# Patient Record
Sex: MALE | Race: WHITE | Employment: FULL TIME | URBAN - METROPOLITAN AREA
[De-identification: names, ages, dates, MRNs, and addresses within clinical notes are randomized per-mention and may not be internally consistent; named-entity substitution may affect disease eponyms.]

---

## 2018-09-01 ENCOUNTER — OFFICE VISIT (OUTPATIENT)
Dept: ORTHOPEDIC SURGERY | Age: 48
End: 2018-09-01

## 2018-09-01 VITALS
SYSTOLIC BLOOD PRESSURE: 108 MMHG | HEART RATE: 80 BPM | BODY MASS INDEX: 24.59 KG/M2 | WEIGHT: 153 LBS | HEIGHT: 66 IN | DIASTOLIC BLOOD PRESSURE: 75 MMHG

## 2018-09-01 DIAGNOSIS — M25.531 RIGHT WRIST PAIN: Primary | ICD-10-CM

## 2018-09-01 DIAGNOSIS — M77.8 RIGHT WRIST TENDINITIS: ICD-10-CM

## 2018-09-01 PROCEDURE — L3908 WHO COCK-UP NONMOLDE PRE OTS: HCPCS | Performed by: NURSE PRACTITIONER

## 2018-09-01 PROCEDURE — 99203 OFFICE O/P NEW LOW 30 MIN: CPT | Performed by: NURSE PRACTITIONER

## 2018-09-01 RX ORDER — MELOXICAM 7.5 MG/1
15 TABLET ORAL DAILY
Qty: 30 TABLET | Refills: 0 | Status: SHIPPED | OUTPATIENT
Start: 2018-09-01 | End: 2019-02-04

## 2018-09-01 NOTE — PROGRESS NOTES
Subjective    Chief Complaint   Patient presents with    New Patient     R Wrist       [place] Sprain presents today for right wrist pain. The patient has had pain intermittently for the past week however the past couple of days the pain has been constant and has worsened. There is no specific mechanism of injury. He points to the ulnar styloid as the source of his pain. He says that the pain does radiate down into his fingers at times and he does have intermittent numbness in the fingers at times. The patient works as a  and is often chopping and using that right hand to carry heavy trays. He is mostly right-handed he says. He has been taking ibuprofen as well as using ice with no relief. Pain Assessment  Location of Pain: Wrist  Location Modifiers: Right    Patient's medications, allergies, past medical, surgical, social and family histories were reviewed and updated as appropriate. Physical Exam  Constitutional:  Pt well groomed, no acute distress, well developed, no obvious deformities  Vitals:    09/01/18 0911   BP: 108/75   Pulse: 80   Weight: 153 lb (69.4 kg)   Height: 5' 5.5\" (1.664 m)     -Oriented to person, place, and time  -mood and affect are appropriate    EXAM: Right hand: Pain over ulnar styloid. -There is not deformity  -There  is not ecchymosis  -There is not swelling. -ROM: FROm.  strength 5/5. Phallens sign negative,   Finklesteins negative,   CMC Grind test negative,   Piano Key Test negative,    Cozen's sign negative. Contralateral Exam:  -No obvious deformities  -No abrasions or cellulitis noted, NVI   -Full ROM   -No joint laxity  -no palpable tenderness noted    Neurological:   -There is any complaints of numbness and tingling.    -Motor and sensory is at median, radial and ulnar nerve distributions. -NVI to upper extremities bilaterally.      Skin:  No abrasions, lesions, cellulitic changes, obvious deformities noted     Xray:  3 view (AP/Lat/Oblique) of right hand show:     Assessment: right hand/wrist tendinitis    Plan: The patient was placed in a tightened wrist brace. A prescription for meloxicam was sent over to his pharmacy. He was instructed not to take any other NSAIDs while he is taking his medications. He says that he is able to do light duty at work and will continue working as he can tolerate. He will continue to ice his wrist as needed. He will follow up with hand in 2 weeks if his pain does not improve. Procedures    Kay Rajan Titan Wrist Short Brace     Patient was prescribed a Kay Rajan Titan Wrist Orthosis. The right wrist will require stabilization / immobilization from this semi-rigid / rigid orthosis to improve their function. The orthosis will assist in protecting the affected area, provide functional support and facilitate healing. The patient was educated and fit by a healthcare professional with expert knowledge and specialization in brace application while under the direct supervision of the treating physician. Verbal and written instructions for the use of and application of this item were provided. They were instructed to contact the office immediately should the brace result in increased pain, decreased sensation, increased swelling or worsening of the condition.

## 2018-10-04 ENCOUNTER — TELEPHONE (OUTPATIENT)
Dept: ORTHOPEDIC SURGERY | Age: 48
End: 2018-10-04

## 2018-10-05 RX ORDER — MELOXICAM 15 MG/1
15 TABLET ORAL DAILY
Qty: 30 TABLET | Refills: 1 | Status: SHIPPED | OUTPATIENT
Start: 2018-10-05 | End: 2019-02-04

## 2019-02-04 ENCOUNTER — APPOINTMENT (OUTPATIENT)
Dept: CT IMAGING | Age: 49
End: 2019-02-04
Payer: COMMERCIAL

## 2019-02-04 ENCOUNTER — APPOINTMENT (OUTPATIENT)
Dept: GENERAL RADIOLOGY | Age: 49
End: 2019-02-04
Payer: COMMERCIAL

## 2019-02-04 ENCOUNTER — HOSPITAL ENCOUNTER (EMERGENCY)
Age: 49
Discharge: HOME | End: 2019-02-04
Attending: EMERGENCY MEDICINE
Payer: COMMERCIAL

## 2019-02-04 VITALS
HEIGHT: 65 IN | WEIGHT: 155 LBS | SYSTOLIC BLOOD PRESSURE: 116 MMHG | BODY MASS INDEX: 25.83 KG/M2 | OXYGEN SATURATION: 97 % | DIASTOLIC BLOOD PRESSURE: 76 MMHG | TEMPERATURE: 98.2 F | HEART RATE: 87 BPM | RESPIRATION RATE: 18 BRPM

## 2019-02-04 DIAGNOSIS — G89.29 CHRONIC LOW BACK PAIN WITHOUT SCIATICA, UNSPECIFIED BACK PAIN LATERALITY: Primary | ICD-10-CM

## 2019-02-04 DIAGNOSIS — M54.50 CHRONIC LOW BACK PAIN WITHOUT SCIATICA, UNSPECIFIED BACK PAIN LATERALITY: Primary | ICD-10-CM

## 2019-02-04 LAB
ANION GAP SERPL CALCULATED.3IONS-SCNC: 12 MMOL/L (ref 3–16)
BASOPHILS ABSOLUTE: 0.1 K/UL (ref 0–0.2)
BASOPHILS RELATIVE PERCENT: 0.7 %
BUN BLDV-MCNC: 12 MG/DL (ref 7–20)
CALCIUM SERPL-MCNC: 9.4 MG/DL (ref 8.3–10.6)
CHLORIDE BLD-SCNC: 101 MMOL/L (ref 99–110)
CO2: 26 MMOL/L (ref 21–32)
CREAT SERPL-MCNC: 0.7 MG/DL (ref 0.9–1.3)
EOSINOPHILS ABSOLUTE: 0.1 K/UL (ref 0–0.6)
EOSINOPHILS RELATIVE PERCENT: 0.6 %
GFR AFRICAN AMERICAN: >60
GFR NON-AFRICAN AMERICAN: >60
GLUCOSE BLD-MCNC: 123 MG/DL (ref 70–99)
HCT VFR BLD CALC: 46.6 % (ref 40.5–52.5)
HEMOGLOBIN: 16.3 G/DL (ref 13.5–17.5)
LYMPHOCYTES ABSOLUTE: 1 K/UL (ref 1–5.1)
LYMPHOCYTES RELATIVE PERCENT: 10.3 %
MCH RBC QN AUTO: 31.6 PG (ref 26–34)
MCHC RBC AUTO-ENTMCNC: 34.9 G/DL (ref 31–36)
MCV RBC AUTO: 90.4 FL (ref 80–100)
MONOCYTES ABSOLUTE: 0.3 K/UL (ref 0–1.3)
MONOCYTES RELATIVE PERCENT: 2.7 %
NEUTROPHILS ABSOLUTE: 8.7 K/UL (ref 1.7–7.7)
NEUTROPHILS RELATIVE PERCENT: 85.7 %
PDW BLD-RTO: 12.8 % (ref 12.4–15.4)
PLATELET # BLD: 322 K/UL (ref 135–450)
PMV BLD AUTO: 7.4 FL (ref 5–10.5)
POTASSIUM SERPL-SCNC: 4.7 MMOL/L (ref 3.5–5.1)
RBC # BLD: 5.15 M/UL (ref 4.2–5.9)
SEDIMENTATION RATE, ERYTHROCYTE: 8 MM/HR (ref 0–20)
SODIUM BLD-SCNC: 139 MMOL/L (ref 136–145)
WBC # BLD: 10.2 K/UL (ref 4–11)

## 2019-02-04 PROCEDURE — 6360000002 HC RX W HCPCS: Performed by: NURSE PRACTITIONER

## 2019-02-04 PROCEDURE — 85025 COMPLETE CBC W/AUTO DIFF WBC: CPT

## 2019-02-04 PROCEDURE — 6370000000 HC RX 637 (ALT 250 FOR IP): Performed by: NURSE PRACTITIONER

## 2019-02-04 PROCEDURE — 80048 BASIC METABOLIC PNL TOTAL CA: CPT

## 2019-02-04 PROCEDURE — 85652 RBC SED RATE AUTOMATED: CPT

## 2019-02-04 PROCEDURE — 74176 CT ABD & PELVIS W/O CONTRAST: CPT

## 2019-02-04 PROCEDURE — 96372 THER/PROPH/DIAG INJ SC/IM: CPT

## 2019-02-04 PROCEDURE — 6370000000 HC RX 637 (ALT 250 FOR IP): Performed by: EMERGENCY MEDICINE

## 2019-02-04 PROCEDURE — 99283 EMERGENCY DEPT VISIT LOW MDM: CPT

## 2019-02-04 RX ORDER — KETOROLAC TROMETHAMINE 30 MG/ML
30 INJECTION, SOLUTION INTRAMUSCULAR; INTRAVENOUS ONCE
Status: COMPLETED | OUTPATIENT
Start: 2019-02-04 | End: 2019-02-04

## 2019-02-04 RX ORDER — METHOCARBAMOL 750 MG/1
750 TABLET, FILM COATED ORAL 4 TIMES DAILY
Qty: 30 TABLET | Refills: 0 | Status: SHIPPED | OUTPATIENT
Start: 2019-02-04 | End: 2019-02-14

## 2019-02-04 RX ORDER — HYDROCODONE BITARTRATE AND ACETAMINOPHEN 5; 325 MG/1; MG/1
1 TABLET ORAL EVERY 6 HOURS PRN
Qty: 6 TABLET | Refills: 0 | Status: SHIPPED | OUTPATIENT
Start: 2019-02-04 | End: 2019-02-07

## 2019-02-04 RX ORDER — HYDROCODONE BITARTRATE AND ACETAMINOPHEN 7.5; 325 MG/1; MG/1
1 TABLET ORAL ONCE
Status: COMPLETED | OUTPATIENT
Start: 2019-02-04 | End: 2019-02-04

## 2019-02-04 RX ORDER — LIDOCAINE 4 G/G
1 PATCH TOPICAL DAILY
Status: DISCONTINUED | OUTPATIENT
Start: 2019-02-04 | End: 2019-02-04 | Stop reason: HOSPADM

## 2019-02-04 RX ORDER — PREDNISONE 20 MG/1
60 TABLET ORAL ONCE
Status: COMPLETED | OUTPATIENT
Start: 2019-02-04 | End: 2019-02-04

## 2019-02-04 RX ORDER — ORPHENADRINE CITRATE 30 MG/ML
60 INJECTION INTRAMUSCULAR; INTRAVENOUS EVERY 12 HOURS
Status: DISCONTINUED | OUTPATIENT
Start: 2019-02-04 | End: 2019-02-04 | Stop reason: HOSPADM

## 2019-02-04 RX ADMIN — ORPHENADRINE CITRATE 60 MG: 30 INJECTION INTRAMUSCULAR; INTRAVENOUS at 09:47

## 2019-02-04 RX ADMIN — PREDNISONE 60 MG: 20 TABLET ORAL at 09:47

## 2019-02-04 RX ADMIN — KETOROLAC TROMETHAMINE 30 MG: 30 INJECTION, SOLUTION INTRAMUSCULAR at 09:47

## 2019-02-04 RX ADMIN — HYDROCODONE BITARTRATE AND ACETAMINOPHEN 1 TABLET: 7.5; 325 TABLET ORAL at 11:29

## 2019-02-04 ASSESSMENT — PAIN SCALES - GENERAL
PAINLEVEL_OUTOF10: 10
PAINLEVEL_OUTOF10: 7
PAINLEVEL_OUTOF10: 10
PAINLEVEL_OUTOF10: 10

## 2019-02-04 ASSESSMENT — PAIN DESCRIPTION - LOCATION
LOCATION: BACK
LOCATION: BACK

## 2019-02-04 ASSESSMENT — PAIN DESCRIPTION - PAIN TYPE
TYPE: ACUTE PAIN
TYPE: ACUTE PAIN

## 2019-02-04 ASSESSMENT — ENCOUNTER SYMPTOMS
COLOR CHANGE: 0
BACK PAIN: 1

## 2019-02-04 ASSESSMENT — PAIN DESCRIPTION - FREQUENCY: FREQUENCY: INTERMITTENT

## 2019-02-04 ASSESSMENT — PAIN DESCRIPTION - ONSET: ONSET: ON-GOING

## 2019-02-04 ASSESSMENT — PAIN DESCRIPTION - DESCRIPTORS: DESCRIPTORS: ACHING

## 2019-02-04 ASSESSMENT — PAIN DESCRIPTION - PROGRESSION: CLINICAL_PROGRESSION: NOT CHANGED

## 2021-04-22 ENCOUNTER — APPOINTMENT (OUTPATIENT)
Dept: CT IMAGING | Age: 51
End: 2021-04-22
Payer: COMMERCIAL

## 2021-04-22 ENCOUNTER — HOSPITAL ENCOUNTER (EMERGENCY)
Age: 51
Discharge: HOME | End: 2021-04-22
Attending: EMERGENCY MEDICINE
Payer: COMMERCIAL

## 2021-04-22 VITALS
SYSTOLIC BLOOD PRESSURE: 130 MMHG | RESPIRATION RATE: 12 BRPM | WEIGHT: 152 LBS | OXYGEN SATURATION: 99 % | BODY MASS INDEX: 25.33 KG/M2 | HEART RATE: 90 BPM | DIASTOLIC BLOOD PRESSURE: 90 MMHG | HEIGHT: 65 IN | TEMPERATURE: 97.4 F

## 2021-04-22 DIAGNOSIS — R42 DIZZINESS: Primary | ICD-10-CM

## 2021-04-22 DIAGNOSIS — Z86.69 H/O MENIERE'S DISEASE: ICD-10-CM

## 2021-04-22 DIAGNOSIS — R11.2 NON-INTRACTABLE VOMITING WITH NAUSEA, UNSPECIFIED VOMITING TYPE: ICD-10-CM

## 2021-04-22 LAB
A/G RATIO: 1.8 (ref 1.1–2.2)
ALBUMIN SERPL-MCNC: 4.8 G/DL (ref 3.4–5)
ALP BLD-CCNC: 75 U/L (ref 40–129)
ALT SERPL-CCNC: 42 U/L (ref 10–40)
ANION GAP SERPL CALCULATED.3IONS-SCNC: 15 MMOL/L (ref 3–16)
AST SERPL-CCNC: 21 U/L (ref 15–37)
BASOPHILS ABSOLUTE: 0.1 K/UL (ref 0–0.2)
BASOPHILS RELATIVE PERCENT: 0.9 %
BILIRUB SERPL-MCNC: <0.2 MG/DL (ref 0–1)
BUN BLDV-MCNC: 12 MG/DL (ref 7–20)
CALCIUM SERPL-MCNC: 10.6 MG/DL (ref 8.3–10.6)
CHLORIDE BLD-SCNC: 103 MMOL/L (ref 99–110)
CO2: 21 MMOL/L (ref 21–32)
CREAT SERPL-MCNC: 0.8 MG/DL (ref 0.9–1.3)
EOSINOPHILS ABSOLUTE: 0.2 K/UL (ref 0–0.6)
EOSINOPHILS RELATIVE PERCENT: 1.4 %
GFR AFRICAN AMERICAN: >60
GFR NON-AFRICAN AMERICAN: >60
GLOBULIN: 2.7 G/DL
GLUCOSE BLD-MCNC: 122 MG/DL (ref 70–99)
HCT VFR BLD CALC: 46.5 % (ref 40.5–52.5)
HEMOGLOBIN: 16.1 G/DL (ref 13.5–17.5)
LYMPHOCYTES ABSOLUTE: 3.2 K/UL (ref 1–5.1)
LYMPHOCYTES RELATIVE PERCENT: 22.7 %
MCH RBC QN AUTO: 30.4 PG (ref 26–34)
MCHC RBC AUTO-ENTMCNC: 34.5 G/DL (ref 31–36)
MCV RBC AUTO: 88.1 FL (ref 80–100)
MONOCYTES ABSOLUTE: 1 K/UL (ref 0–1.3)
MONOCYTES RELATIVE PERCENT: 7.2 %
NEUTROPHILS ABSOLUTE: 9.6 K/UL (ref 1.7–7.7)
NEUTROPHILS RELATIVE PERCENT: 67.8 %
PDW BLD-RTO: 13 % (ref 12.4–15.4)
PLATELET # BLD: 363 K/UL (ref 135–450)
PMV BLD AUTO: 7 FL (ref 5–10.5)
POTASSIUM SERPL-SCNC: 3.8 MMOL/L (ref 3.5–5.1)
RBC # BLD: 5.28 M/UL (ref 4.2–5.9)
SODIUM BLD-SCNC: 139 MMOL/L (ref 136–145)
TOTAL PROTEIN: 7.5 G/DL (ref 6.4–8.2)
WBC # BLD: 14.2 K/UL (ref 4–11)

## 2021-04-22 PROCEDURE — 70498 CT ANGIOGRAPHY NECK: CPT

## 2021-04-22 PROCEDURE — 85025 COMPLETE CBC W/AUTO DIFF WBC: CPT

## 2021-04-22 PROCEDURE — 6370000000 HC RX 637 (ALT 250 FOR IP): Performed by: EMERGENCY MEDICINE

## 2021-04-22 PROCEDURE — 6360000004 HC RX CONTRAST MEDICATION: Performed by: STUDENT IN AN ORGANIZED HEALTH CARE EDUCATION/TRAINING PROGRAM

## 2021-04-22 PROCEDURE — 2580000003 HC RX 258: Performed by: STUDENT IN AN ORGANIZED HEALTH CARE EDUCATION/TRAINING PROGRAM

## 2021-04-22 PROCEDURE — 6360000002 HC RX W HCPCS: Performed by: EMERGENCY MEDICINE

## 2021-04-22 PROCEDURE — 99285 EMERGENCY DEPT VISIT HI MDM: CPT

## 2021-04-22 PROCEDURE — 80053 COMPREHEN METABOLIC PANEL: CPT

## 2021-04-22 PROCEDURE — 70450 CT HEAD/BRAIN W/O DYE: CPT

## 2021-04-22 PROCEDURE — 96374 THER/PROPH/DIAG INJ IV PUSH: CPT

## 2021-04-22 RX ORDER — MECLIZINE HYDROCHLORIDE 25 MG/1
25 TABLET ORAL 3 TIMES DAILY PRN
Qty: 30 TABLET | Refills: 0 | Status: SHIPPED | OUTPATIENT
Start: 2021-04-22 | End: 2021-04-29

## 2021-04-22 RX ORDER — ONDANSETRON 2 MG/ML
4 INJECTION INTRAMUSCULAR; INTRAVENOUS EVERY 30 MIN PRN
Status: DISCONTINUED | OUTPATIENT
Start: 2021-04-22 | End: 2021-04-22 | Stop reason: HOSPADM

## 2021-04-22 RX ORDER — MECLIZINE HCL 12.5 MG/1
25 TABLET ORAL ONCE
Status: COMPLETED | OUTPATIENT
Start: 2021-04-22 | End: 2021-04-22

## 2021-04-22 RX ORDER — ONDANSETRON 2 MG/ML
4 INJECTION INTRAMUSCULAR; INTRAVENOUS ONCE
Status: DISCONTINUED | OUTPATIENT
Start: 2021-04-22 | End: 2021-04-22 | Stop reason: HOSPADM

## 2021-04-22 RX ORDER — METHYLPREDNISOLONE 4 MG/1
TABLET ORAL
Qty: 1 KIT | Refills: 0 | Status: SHIPPED | OUTPATIENT
Start: 2021-04-22 | End: 2021-04-28

## 2021-04-22 RX ORDER — ONDANSETRON 4 MG/1
4 TABLET, FILM COATED ORAL EVERY 8 HOURS PRN
Qty: 10 TABLET | Refills: 0 | Status: SHIPPED | OUTPATIENT
Start: 2021-04-22 | End: 2022-01-27

## 2021-04-22 RX ORDER — DEXAMETHASONE SODIUM PHOSPHATE 4 MG/ML
4 INJECTION, SOLUTION INTRA-ARTICULAR; INTRALESIONAL; INTRAMUSCULAR; INTRAVENOUS; SOFT TISSUE ONCE
Status: COMPLETED | OUTPATIENT
Start: 2021-04-22 | End: 2021-04-22

## 2021-04-22 RX ORDER — 0.9 % SODIUM CHLORIDE 0.9 %
500 INTRAVENOUS SOLUTION INTRAVENOUS ONCE
Status: COMPLETED | OUTPATIENT
Start: 2021-04-22 | End: 2021-04-22

## 2021-04-22 RX ADMIN — DEXAMETHASONE SODIUM PHOSPHATE 4 MG: 4 INJECTION, SOLUTION INTRA-ARTICULAR; INTRALESIONAL; INTRAMUSCULAR; INTRAVENOUS; SOFT TISSUE at 12:55

## 2021-04-22 RX ADMIN — MECLIZINE 25 MG: 12.5 TABLET ORAL at 13:41

## 2021-04-22 RX ADMIN — IOPAMIDOL 75 ML: 755 INJECTION, SOLUTION INTRAVENOUS at 13:33

## 2021-04-22 RX ADMIN — ONDANSETRON 4 MG: 2 INJECTION INTRAMUSCULAR; INTRAVENOUS at 12:55

## 2021-04-22 RX ADMIN — SODIUM CHLORIDE 500 ML: 9 INJECTION, SOLUTION INTRAVENOUS at 12:55

## 2021-04-22 ASSESSMENT — ENCOUNTER SYMPTOMS
STRIDOR: 0
VOMITING: 1
BLOOD IN STOOL: 0
SHORTNESS OF BREATH: 0
EYE REDNESS: 0
ABDOMINAL PAIN: 0
SINUS PRESSURE: 0
NAUSEA: 1
PHOTOPHOBIA: 0
DIARRHEA: 0
COUGH: 0
CHEST TIGHTNESS: 0
SORE THROAT: 0
BACK PAIN: 0
EYE PAIN: 0
FACIAL SWELLING: 0
EYE ITCHING: 0
ANAL BLEEDING: 0
CONSTIPATION: 0
VOICE CHANGE: 0
WHEEZING: 0
EYE DISCHARGE: 0
ABDOMINAL DISTENTION: 0
TROUBLE SWALLOWING: 0
RHINORRHEA: 0

## 2021-04-22 ASSESSMENT — PAIN SCALES - GENERAL: PAINLEVEL_OUTOF10: 2

## 2021-04-22 ASSESSMENT — PAIN DESCRIPTION - LOCATION: LOCATION: EYE

## 2021-04-22 ASSESSMENT — PAIN DESCRIPTION - ORIENTATION: ORIENTATION: LEFT

## 2021-04-22 NOTE — ED PROVIDER NOTES
Never Used   Substance and Sexual Activity    Alcohol use: Yes     Alcohol/week: 3.3 standard drinks     Types: 4 Standard drinks or equivalent per week    Drug use: No    Sexual activity: Not on file   Lifestyle    Physical activity     Days per week: Not on file     Minutes per session: Not on file    Stress: Not on file   Relationships    Social connections     Talks on phone: Not on file     Gets together: Not on file     Attends Adventism service: Not on file     Active member of club or organization: Not on file     Attends meetings of clubs or organizations: Not on file     Relationship status: Not on file    Intimate partner violence     Fear of current or ex partner: Not on file     Emotionally abused: Not on file     Physically abused: Not on file     Forced sexual activity: Not on file   Other Topics Concern    Not on file   Social History Narrative    Not on file     Current Facility-Administered Medications   Medication Dose Route Frequency Provider Last Rate Last Admin    ondansetron (ZOFRAN) injection 4 mg  4 mg Intravenous Q30 Min PRN Claudio Blake MD        meclizine (ANTIVERT) tablet 25 mg  25 mg Oral Once Claudio Blake MD        Dexamethasone Sodium Phosphate injection 4 mg  4 mg Intravenous Once Claudio Blake MD         Current Outpatient Medications   Medication Sig Dispense Refill    Ascorbic Acid (VITAMIN C) 250 MG tablet Take 250 mg by mouth daily.  Multiple Vitamins-Minerals (THERAPEUTIC MULTIVITAMIN-MINERALS) tablet Take 1 tablet by mouth daily. No Known Allergies    REVIEW OF SYSTEMS  10 systems reviewed, pertinent positives per HPI otherwise noted to be negative. PHYSICAL EXAM  /83   Pulse 91   Temp 97.4 °F (36.3 °C)   Resp 26   Wt 152 lb (68.9 kg)   SpO2 99%   BMI 25.29 kg/m²   Physical Exam  Constitutional:       Appearance: Normal appearance. HENT:      Head: Normocephalic and atraumatic.       Nose: Nose normal.      Mouth/Throat: Mouth: Mucous membranes are moist.   Eyes:      Extraocular Movements: Extraocular movements intact. Left eye: Nystagmus present. Pupils: Pupils are equal, round, and reactive to light. Comments: Horizontal nystagmus of the left eye. Cardiovascular:      Rate and Rhythm: Normal rate and regular rhythm. Pulmonary:      Effort: Pulmonary effort is normal.      Breath sounds: Normal breath sounds. Abdominal:      Palpations: Abdomen is soft. Neurological:      Mental Status: He is alert. Psychiatric:         Mood and Affect: Mood normal.         Behavior: Behavior normal.       LABS  I have reviewed all labs for this visit. Results for orders placed or performed during the hospital encounter of 04/22/21   CBC Auto Differential   Result Value Ref Range    WBC 14.2 (H) 4.0 - 11.0 K/uL    RBC 5.28 4.20 - 5.90 M/uL    Hemoglobin 16.1 13.5 - 17.5 g/dL    Hematocrit 46.5 40.5 - 52.5 %    MCV 88.1 80.0 - 100.0 fL    MCH 30.4 26.0 - 34.0 pg    MCHC 34.5 31.0 - 36.0 g/dL    RDW 13.0 12.4 - 15.4 %    Platelets 045 015 - 831 K/uL    MPV 7.0 5.0 - 10.5 fL    Neutrophils % 67.8 %    Lymphocytes % 22.7 %    Monocytes % 7.2 %    Eosinophils % 1.4 %    Basophils % 0.9 %    Neutrophils Absolute 9.6 (H) 1.7 - 7.7 K/uL    Lymphocytes Absolute 3.2 1.0 - 5.1 K/uL    Monocytes Absolute 1.0 0.0 - 1.3 K/uL    Eosinophils Absolute 0.2 0.0 - 0.6 K/uL    Basophils Absolute 0.1 0.0 - 0.2 K/uL         RADIOLOGY  X-RAYS:  I have reviewed radiologic plain film image(s). ALL OTHER NON-PLAIN FILM IMAGES SUCH AS CT, ULTRASOUND AND MRI HAVE BEEN READ BY THE RADIOLOGIST. CTA HEAD NECK W CONTRAST   Final Result   No large vessel occlusion or hemodynamic stenosis. Fetal origin bilateral PCAs. CT HEAD WO CONTRAST   Final Result   No acute intracranial abnormality. Rechecks: Physical assessment performed. Patient is resting currently in bed.   He reports improvement in nausea, and reduction in room unspecified vomiting type    3. H/O Meniere's disease        Blood pressure 122/83, pulse 91, temperature 97.4 °F (36.3 °C), resp. rate 26, weight 152 lb (68.9 kg), SpO2 99 %. DISPOSITION  Amparo Castano was discharged to home in stable condition.        Haleigh Jimenes,   Resident  04/23/21 4145

## 2021-04-22 NOTE — ED PROVIDER NOTES
I interviewed and examined this patient with Dr. Bibiana Villalobos, resident. Please see his note for details of H&P. Sarina Delgadillo is a 44year old male with a history of Meniere's disease and seasonal environmental allergies who has been experiencing some dizziness and nausea for the last few days. He was on his way to visit his ENT doctor this morning when he became quite nauseated and vomited. He denies any pain. NIH stroke score performed, and it is 0. He reports tinitus L>R.        /83   Pulse 91   Temp 97.4 °F (36.3 °C)   Resp 26   Wt 152 lb (68.9 kg)   SpO2 99%   BMI 25.29 kg/m²     I have reviewed the following from the nursing documentation:      Prior to Admission medications    Medication Sig Start Date End Date Taking? Authorizing Provider   Ascorbic Acid (VITAMIN C) 250 MG tablet Take 250 mg by mouth daily. Historical Provider, MD   Multiple Vitamins-Minerals (THERAPEUTIC MULTIVITAMIN-MINERALS) tablet Take 1 tablet by mouth daily.     Historical Provider, MD       Allergies as of 04/22/2021    (No Known Allergies)       Past Medical History:   Diagnosis Date    H/O Meniere's disease     Rectal exam 11/26/2010        Surgical History:   Past Surgical History:   Procedure Laterality Date    APPENDECTOMY      SHOULDER SURGERY          Family History:    Family History   Problem Relation Age of Onset    Breast Cancer Mother        Social History     Socioeconomic History    Marital status: Single     Spouse name: Not on file    Number of children: Not on file    Years of education: Not on file    Highest education level: Not on file   Occupational History    Not on file   Social Needs    Financial resource strain: Not on file    Food insecurity     Worry: Not on file     Inability: Not on file    Transportation needs     Medical: Not on file     Non-medical: Not on file   Tobacco Use    Smoking status: Current Every Day Smoker     Packs/day: 0.30     Types: Cigarettes  Smokeless tobacco: Never Used   Substance and Sexual Activity    Alcohol use: Yes     Alcohol/week: 3.3 standard drinks     Types: 4 Standard drinks or equivalent per week    Drug use: No    Sexual activity: Not on file   Lifestyle    Physical activity     Days per week: Not on file     Minutes per session: Not on file    Stress: Not on file   Relationships    Social connections     Talks on phone: Not on file     Gets together: Not on file     Attends Lutheran service: Not on file     Active member of club or organization: Not on file     Attends meetings of clubs or organizations: Not on file     Relationship status: Not on file    Intimate partner violence     Fear of current or ex partner: Not on file     Emotionally abused: Not on file     Physically abused: Not on file     Forced sexual activity: Not on file   Other Topics Concern    Not on file   Social History Narrative    Not on file         Review of Systems   Constitutional: Negative for activity change, appetite change, chills, diaphoresis, fatigue and fever. HENT: Negative. Negative for congestion, dental problem, ear pain, facial swelling, rhinorrhea, sinus pressure, sneezing, sore throat, tinnitus, trouble swallowing and voice change. Eyes: Negative for photophobia, pain, discharge, redness, itching and visual disturbance. Respiratory: Negative for cough, chest tightness, shortness of breath, wheezing and stridor. Cardiovascular: Negative for chest pain, palpitations and leg swelling. Gastrointestinal: Positive for nausea and vomiting. Negative for abdominal distention, abdominal pain, anal bleeding, blood in stool, constipation and diarrhea. Genitourinary: Negative for difficulty urinating, discharge, dysuria, frequency, hematuria, testicular pain and urgency. Musculoskeletal: Negative for back pain, joint swelling, neck pain and neck stiffness. Skin: Negative for rash and wound.    Neurological: Positive for oriented to person, place, and time. GCS: GCS eye subscore is 4. GCS verbal subscore is 5. GCS motor subscore is 6. Cranial Nerves: No cranial nerve deficit. Sensory: Sensation is intact. Motor: Motor function is intact. No abnormal muscle tone. Coordination: Coordination is intact. Coordination normal.      Deep Tendon Reflexes: Reflexes are normal and symmetric. Reflexes normal.      Reflex Scores:       Bicep reflexes are 2+ on the right side and 2+ on the left side. Patellar reflexes are 2+ on the right side and 2+ on the left side. Comments: Coordination, gait, speech, balance and cognition are intact. There is no nuchal rigidity or evidence of meningismus. Negative Kernig's and Brudzinski's signs. All sensory and motor components of the brachial/lumbosacral plexus tested are symmetric and intact. No focal deficits appreciated. Psychiatric:         Behavior: Behavior normal.         Thought Content:  Thought content normal.         Judgment: Judgment normal.          Procedures     MDM   Results for orders placed or performed during the hospital encounter of 04/22/21   CBC Auto Differential   Result Value Ref Range    WBC 14.2 (H) 4.0 - 11.0 K/uL    RBC 5.28 4.20 - 5.90 M/uL    Hemoglobin 16.1 13.5 - 17.5 g/dL    Hematocrit 46.5 40.5 - 52.5 %    MCV 88.1 80.0 - 100.0 fL    MCH 30.4 26.0 - 34.0 pg    MCHC 34.5 31.0 - 36.0 g/dL    RDW 13.0 12.4 - 15.4 %    Platelets 145 414 - 218 K/uL    MPV 7.0 5.0 - 10.5 fL    Neutrophils % 67.8 %    Lymphocytes % 22.7 %    Monocytes % 7.2 %    Eosinophils % 1.4 %    Basophils % 0.9 %    Neutrophils Absolute 9.6 (H) 1.7 - 7.7 K/uL    Lymphocytes Absolute 3.2 1.0 - 5.1 K/uL    Monocytes Absolute 1.0 0.0 - 1.3 K/uL    Eosinophils Absolute 0.2 0.0 - 0.6 K/uL    Basophils Absolute 0.1 0.0 - 0.2 K/uL   Comprehensive Metabolic Panel   Result Value Ref Range    Sodium 139 136 - 145 mmol/L    Potassium 3.8 3.5 - 5.1 mmol/L    Chloride 103 99 - 110 mmol/L    CO2 21 21 - 32 mmol/L    Anion Gap 15 3 - 16    Glucose 122 (H) 70 - 99 mg/dL    BUN 12 7 - 20 mg/dL    CREATININE 0.8 (L) 0.9 - 1.3 mg/dL    GFR Non-African American >60 >60    GFR African American >60 >60    Calcium 10.6 8.3 - 10.6 mg/dL    Total Protein 7.5 6.4 - 8.2 g/dL    Albumin 4.8 3.4 - 5.0 g/dL    Albumin/Globulin Ratio 1.8 1.1 - 2.2    Total Bilirubin <0.2 0.0 - 1.0 mg/dL    Alkaline Phosphatase 75 40 - 129 U/L    ALT 42 (H) 10 - 40 U/L    AST 21 15 - 37 U/L    Globulin 2.7 g/dL       I estimate there is LOW risk for SUBARACHNOID HEMORRHAGE, MENINGITIS, INTRACRANIAL HEMORRHAGE, SUBDURAL HEMATOMA, OR STROKE, thus I consider the discharge disposition reasonable. Al Toledo and I have discussed the diagnosis and risks, and we agree with discharging home to follow-up with their primary doctor. We also discussed returning to the Emergency Department immediately if new or worsening symptoms occur. We have discussed the symptoms which are most concerning (e.g., changing or worsening pain, weakness, vomiting, fever) that necessitate immediate return. Final Impression    1. Dizziness    2. Non-intractable vomiting with nausea, unspecified vomiting type    3. H/O Meniere's disease        Discharge Vital Signs:  Blood pressure 120/87, pulse 90, temperature 97.4 °F (36.3 °C), resp. rate 16, height 5' 5\" (1.651 m), weight 152 lb (68.9 kg), SpO2 97 %. Radiology  Ct Head Wo Contrast    Result Date: 4/22/2021  No acute intracranial abnormality. Cta Head Neck W Contrast    Result Date: 4/22/2021  No large vessel occlusion or hemodynamic stenosis. Fetal origin bilateral PCAs. Recheck 2:30 pm Patient is feeling much improved. No adverse reaction to medications. No further emesis. We have consulted ENT Dr. Suzanna Ryan regarding this patient. Please see Dr. Carol Clancy note for details of this consultation. ENT agrees with outpatient management and agrees to follow up in office. Austin Renee MD  04/22/21 4034

## 2021-04-22 NOTE — DISCHARGE INSTR - COC
Delivery   [facility] MED Delivery:[id number]}    Wound Care Documentation and Therapy:        Elimination:  Continence:   · Bowel: {YES / UQ:85898}  · Bladder: {YES / IH:11730}  Urinary Catheter: {Urinary Catheter:158370777}   Colostomy/Ileostomy/Ileal Conduit: {YES / HO:47646}       Date of Last BM: ***  No intake or output data in the 24 hours ending 21 1510  No intake/output data recorded.     Safety Concerns:     [facility] Safety Concerns:037727689}    Impairments/Disabilities:      [facility] Impairments/Disabilities:188158420}    Nutrition Therapy:  Current Nutrition Therapy:   [address] [place] [address] Diet List:280613204}    Routes of Feeding: {CHP DME Other Feedings:448697389}  Liquids: {Slp liquid thickness:21623}  Daily Fluid Restriction: {CHP DME Yes amt example:428418576}  Last Modified Barium Swallow with Video (Video Swallowing Test): {Done Not Done LPPS:035411769}    Treatments at the Time of Hospital Discharge:   Respiratory Treatments: ***  Oxygen Therapy:  {Therapy; copd oxygen:73268}  Ventilator:    { CC Vent DDVC:753167488}    Rehab Therapies: {THERAPEUTIC INTERVENTION:5882221966}  Weight Bearing Status/Restrictions: [address] [place] [address] Weight Bearin}  Other Medical Equipment (for information only, NOT a DME order):  {EQUIPMENT:015635065}  Other Treatments: ***    Patient's personal belongings (please select all that are sent with patient):  {[facility] DME Belongings:875143959}    RN SIGNATURE:  {Esignature:547482750}    CASE MANAGEMENT/SOCIAL WORK SECTION    Inpatient Status Date: ***    Readmission Risk Assessment Score:  Readmission Risk              Risk of Unplanned Readmission:        0           Discharging to Facility/ Agency   · Name:   · Address:  · Phone:  · Fax:    Dialysis Facility (if applicable)   · Name:  · Address:  · Dialysis Schedule:  · Phone:  · Fax:    / signature: {Esignature:306808516}    PHYSICIAN SECTION    Prognosis: {Prognosis:0284674620}    Condition at Discharge: [address] [place] [address] Patient Condition:630339143}    Rehab Potential (if transferring to Rehab): {Prognosis:9499098327}    Recommended Labs or Other Treatments After Discharge: ***    Physician Certification: I certify the above information and transfer of Serina Silva  is necessary for the continuing treatment of the diagnosis listed and that he requires {Admit to Appropriate Level of Care:68749} for {GREATER/LESS:682688389} 30 days.      Update Admission H&P: {CHP DME Changes in KWMYT:865565861}    PHYSICIAN SIGNATURE:  {Esignature:517482529}

## 2021-04-22 NOTE — ED NOTES
[facility]/devonte consult @0318  Re: vertigo per [email or web address] [email or web address] [address]  04/22/21 3622

## 2021-04-29 ENCOUNTER — OFFICE VISIT (OUTPATIENT)
Dept: ENT CLINIC | Age: 51
End: 2021-04-29
Payer: COMMERCIAL

## 2021-04-29 ENCOUNTER — PROCEDURE VISIT (OUTPATIENT)
Dept: AUDIOLOGY | Age: 51
End: 2021-04-29
Payer: COMMERCIAL

## 2021-04-29 VITALS — WEIGHT: 152 LBS | HEIGHT: 65 IN | BODY MASS INDEX: 25.33 KG/M2 | TEMPERATURE: 99.1 F

## 2021-04-29 DIAGNOSIS — H91.22 SUDDEN LEFT HEARING LOSS: Primary | ICD-10-CM

## 2021-04-29 DIAGNOSIS — R42 VERTIGO: ICD-10-CM

## 2021-04-29 DIAGNOSIS — H93.13 TINNITUS, BILATERAL: ICD-10-CM

## 2021-04-29 DIAGNOSIS — R42 DIZZINESS: ICD-10-CM

## 2021-04-29 DIAGNOSIS — H90.3 SENSORINEURAL HEARING LOSS, BILATERAL: Primary | ICD-10-CM

## 2021-04-29 PROCEDURE — 92557 COMPREHENSIVE HEARING TEST: CPT | Performed by: AUDIOLOGIST

## 2021-04-29 PROCEDURE — 92567 TYMPANOMETRY: CPT | Performed by: AUDIOLOGIST

## 2021-04-29 PROCEDURE — 99203 OFFICE O/P NEW LOW 30 MIN: CPT | Performed by: OTOLARYNGOLOGY

## 2021-04-29 RX ORDER — MECLIZINE HCL 12.5 MG/1
12.5 TABLET ORAL 3 TIMES DAILY PRN
Qty: 30 TABLET | Refills: 0 | Status: SHIPPED | OUTPATIENT
Start: 2021-04-29 | End: 2021-04-29

## 2021-04-29 RX ORDER — MECLIZINE HCL 12.5 MG/1
12.5 TABLET ORAL 3 TIMES DAILY PRN
Qty: 30 TABLET | Refills: 0 | Status: SHIPPED | OUTPATIENT
Start: 2021-04-29 | End: 2021-05-09

## 2021-04-29 RX ORDER — PREDNISONE 20 MG/1
TABLET ORAL
Qty: 36 TABLET | Refills: 0 | Status: SHIPPED | OUTPATIENT
Start: 2021-04-29 | End: 2021-05-16

## 2021-04-29 NOTE — PROGRESS NOTES
Tobacco Use    Smoking status: Current Every Day Smoker     Packs/day: 0.30     Types: Cigarettes    Smokeless tobacco: Never Used   Substance Use Topics    Alcohol use: Yes     Alcohol/week: 3.3 standard drinks     Types: 4 Standard drinks or equivalent per week    Drug use: No        Allergies     No Known Allergies    Medications     Current Outpatient Medications   Medication Sig Dispense Refill    predniSONE (DELTASONE) 20 MG tablet Take 3 tablets by mouth daily for 7 days, THEN 2.5 tablets daily for 2 days, THEN 2 tablets daily for 2 days, THEN 1.5 tablets daily for 2 days, THEN 1 tablet daily for 2 days, THEN 0.5 tablets daily for 2 days. 36 tablet 0    meclizine (ANTIVERT) 12.5 MG tablet Take 1 tablet by mouth 3 times daily as needed for Dizziness 30 tablet 0    ondansetron (ZOFRAN) 4 MG tablet Take 1 tablet by mouth every 8 hours as needed for Nausea 10 tablet 0    Ascorbic Acid (VITAMIN C) 250 MG tablet Take 250 mg by mouth daily.  Multiple Vitamins-Minerals (THERAPEUTIC MULTIVITAMIN-MINERALS) tablet Take 1 tablet by mouth daily. No current facility-administered medications for this visit.         Review of Systems     REVIEW OF SYSTEMS  The following systems were reviewed and revealed the following in addition to any already discussed in the HPI:    CONSTITUTIONAL: No weight loss, no fever, no night sweats, no chills  EYES: no vision changes, no blurry vision  EARS: no changes in hearing, no otalgia  NOSE: no epistaxis, no rhinorrhea  RESPIRATORY: No difficulty breathing, no shortness of breath  CV: no chest pain, no peripheral vascular disease  HEME: No coagulation disorder, no bleeding disorder  NEURO: No TIA or stroke-like symptoms  SKIN: No new rashes in the head and neck, no recent skin cancers  MOUTH: No new ulcers, no recent teeth infections  GASTROINTESTINAL: No diarrhea, stomach pain  PSYCH: No anxiety, no depression    PhysicalExam     Vitals:    04/29/21 1413   Temp: 99.1

## 2021-04-29 NOTE — PROGRESS NOTES
Talya Diego   1970, 44 y.o. male   [id number]       Referring Provider: Adama Prasad MD  Referral Type: In an order in [address] [place] [address]    Reason for Visit: Evaluation of suspected change in hearing, tinnitus, and balance. ADULT AUDIOLOGIC EVALUATION      Talya Diego is a 44 y.o. male seen today, 4/29/2021 , Nicci Rowell initial audiologic evaluation. Patient was seen by Adama Prasad MD  prior to today's evaluation. AUDIOLOGIC AND OTHER PERTINENT MEDICAL HISTORY:      Talya Diego noted recent episode of dizziness ~ 1 week ago described as room-spinning accompanied by nausea and vomiting, lasting for hours. He notes prior history of dizziness 9-10 years ago; however was reportedly not as severe as was not associated with nausea or vomiting- denies additional episodes prior to most recent onset. Patient also reports decreased hearing and onset of tinnitus in the left ear gradually worsening over the past 3-4 weeks. No additional significant otologic or medical history was reported. Talya Diego denied otalgia, aural fullness, otorrhea, history of falls, history of occupational/recreational noise exposure, history of head trauma, history of ear surgery and family history of hearing loss. Date: 4/29/2021     IMPRESSIONS:      Today's results revealed an asymmetric sensorineural hearing loss, bilaterally. Asymmetries > 10 dBHL noted from 250-2000Hz, at 8000Hz and in word recognition with left ear (80%) worse than right (100%). Discussed use of tinnitus management strategies. Follow medical recommendations of Adama Prasad MD.     ASSESSMENT AND FINDINGS:     Otoscopy revealed: Clear ear canals bilaterally    RIGHT EAR:  Hearing Sensitivity: Within normal limits through 1kHz sloping to a mild to moderate sensorineural hearing loss through [facility]. Speech Recognition Threshold: 25 dB HL  Word Recognition: Excellent (100%), based on NU-6 25-word list at 60 dBHL using recorded speech stimuli. Tympanometry: Normal peak pressure and compliance, Type A tympanogram, consistent with normal middle ear function. Acoustic Reflexes: Ipsilateral: Could not maintain seal. Contralateral: Could not maintain seal.    LEFT EAR:  Hearing Sensitivity: Moderately-severe rising to moderate sensorineural hearing loss through 3kHz sloping to a moderately-severe sensorineural hearing loss from 4-8kHz. Speech Recognition Threshold: 45 dB HL  Word Recognition: Good (80%), based on NU-6 25-word list at 90 dBHL masked using recorded speech stimuli. Tympanometry: Normal peak pressure and compliance, Type A tympanogram, consistent with normal middle ear function. Acoustic Reflexes: Ipsilateral: Did not test. Contralateral: Did not test.    Reliability: Good  Transducer: Inserts    See scanned audiogram dated 4/29/2021  for results. **NOTE: Asymmetries > 10 dBHL noted from 250-2000Hz, at 8000Hz and in word recognition with left ear (80%) worse than right (100%). PATIENT EDUCATION:       The following items were discussed with the patient:    - Good Communication Strategies   - Tinnitus Management Strategies    - Dizziness    Educational information was shared in the After Visit Summary. RECOMMENDATIONS:                                                                                                                                                                                                                                                            The following items are recommended based on patient report and results from today's appointment:   - Continue medical follow-up with Mak Mcdonald MD.   - Retest hearing as medically indicated and/or sooner if a change in hearing is noted. - Utilize \"Good Communication Strategies\" as discussed to assist in speech understanding with communication partners.    - Maintain a sound enriched environment to assist in the management of tinnitus symptoms. - If medically indicated, consider vestibular evaluation to further investigate symptoms of dizziness.        Cristiano Stoner  Audiologist    Chart CC'd to: Bijan Rico MD      Degree of   Hearing Sensitivity dB Range   Within Normal Limits (WNL) 0 - 20   Mild 20 - 40   Moderate 40 - 55   Moderately-Severe 55 - 70   Severe 70 - 90   Profound 90 +

## 2021-04-29 NOTE — PATIENT INSTRUCTIONS
Good Communication Strategies    Communication can be challenging for anyone, but can be especially difficult for those with some degree of hearing loss. While we may not be able to control every factor that may lead to difficulty with communication, there are Good Communication Strategies that we can all use in our day-to-day lives. Communication takes both parties working together for it to be successful. Tips as a Listener:   1. Control your environment. It is important to limit the amount of background noise in the room when possible. You should also consider having a good light source in the room to best see the other person. 2. Ask for clarification. Instead of saying \"What?\", you can use parts of what you heard to make a new question. For example, if you heard the word \"Thursday\" but not the rest of the week, you may ask \"What was that about Thursday? \" or \"What did you want to do Thursday? \". This shows the person talking that you are listening and will help them better explain what they are saying. 3. Be an advocate for yourself. If you are hearing but not understanding, tell the other person \"I can hear you, but I need you to slow down when you speak. \"  Or if someone is facing the other direction, say \"I cannot hear you when you are not looking at me when we talk. \"       Tips as a Talker:   - Sit or stand 3 to 6 feet away to maximize audibility         -- It is unrealistic to believe someone else will fully hear your message if you are speaking from across the room or in a different room in the house   - Stay at eye level to help with visual cues   - Make sure you have the persons attention before speaking   - Use facial expressions and gestures to accentuate your message   - Raise your voice slightly (do not scream)   - Speak slowly and distinctly   - Use short, simple sentences   - Rephrase your words if the person is having a hard time understanding you    - To avoid distortion, dont speak directly into a persons ear      Some additional items that may be helpful:   - Remain patient - this is important for both parties   - Write down items that still cannot be heard/understood. You may write with pen/paper or consider typing/texting on a cell phone or smart device. - If background noise is unavoidable, try to keep yourself in a good position in the room. By sitting at a vigil on the side of the restaurant (preferably a corner), it will be easier to communicate than if you were sitting at a table in the middle with background noise surrounding you. Keep yourself positioned away from music speakers or heavy foot traffic. Tinnitus: Overview and Management Strategies          Many people have some ringing sounds in their ears once in a while. You may hear a roar, a hiss, a tinkle, or a buzz. The sound usually lasts only a few minutes. If it goes on all the time, you may have tinnitus. Tinnitus is usually caused by long-term exposure to loud noise. This damages the nerves in the inner ear. It can occur with all types of hearing loss. It may be a symptom of almost any ear problem. Tinnitus may be caused by a buildup of earwax. Or, it may be caused by ear infections or certain medicines (especially antibiotics or large amounts of aspirin). You can also hear noises in your ears because of an injury to the ears, drinking too much alcohol or caffeine, or a medical condition. Other conditions may also contribute to tinnitus, including: head and neck trauma, temporomandibular joint disorder (TMJ), sinus pressure and barometric trauma, traumatic brain injury, metabolic disorders, autoimmune disorders, stress, and high blood pressure. You may need tests to evaluate your hearing and to find causes of long-lasting tinnitus. Your doctor may suggest one or more treatments to help you cope with the tinnitus. You can also do things at home to help reduce symptoms.     Follow-up care is a tinnitus symptoms by giving your brain better access to the sounds it is missing. There are some hearing aids with built-in noise generator programs, which may help when amplification alone is not enough. Additional resources may be found through the [employer]inn[employer] at www.jessica.org    When should you call for help? Call 911 anytime you think you may need emergency care. For example, call if:    · You have symptoms of a stroke. These may include:  ? Sudden numbness, tingling, weakness, or loss of movement in your face, arm, or leg, especially on only one side of your body. ? Sudden vision changes. ? Sudden trouble speaking. ? Sudden confusion or trouble understanding simple statements. ? Sudden problems with walking or balance. ? A sudden, severe headache that is different from past headaches. Call your doctor now or seek immediate medical care if:    · You develop other symptoms. These may include hearing loss (or worse hearing loss), balance problems, dizziness, nausea, or vomiting. Watch closely for changes in your health, and be sure to contact your doctor if:    · Your tinnitus moves from both ears to one ear. · Your hearing loss gets worse within 1 day after an ear injury. · Your tinnitus or hearing loss does not get better within 1 week after an ear injury. · Your tinnitus bothers you enough that you want to take medicines to help you cope with it. If you notice changes in your tinnitus and/or your hearing, it is recommended that you have your hearing tested by your audiologist and to follow-up with your physician that manages your hearing loss (such as your ENT or Primary Care doctor). Dizziness: Care Instructions  Your Care Instructions  Dizziness is the feeling of unsteadiness or fuzziness in your head. It is different than having vertigo, which is a feeling that the room is spinning or that you are moving or falling.  It is also different from lightheadedness, which is the feeling that you are about to faint. It can be hard to know what causes dizziness. Some people feel dizzy when they have migraine headaches. Sometimes bouts of flu can make you feel dizzy. Some medical conditions, such as heart problems or high blood pressure, can make you feel dizzy. Many medicines can cause dizziness, including medicines for high blood pressure, pain, or anxiety. If a medicine causes your symptoms, your doctor may recommend that you stop or change the medicine. If it is a problem with your heart, you may need medicine to help your heart work better. If there is no clear reason for your symptoms, your doctor may suggest watching and waiting for a while to see if the dizziness goes away on its own. Follow-up care is a key part of your treatment and safety. Be sure to make and go to all appointments, and call your doctor if you are having problems. It's also a good idea to know your test results and keep a list of the medicines you take. How can you care for yourself at home? · If your doctor recommends or prescribes medicine, take it exactly as directed. Call your doctor if you think you are having a problem with your medicine. · Do not drive while you feel dizzy. · Try to prevent falls. Steps you can take include:  ? Using nonskid mats, adding grab bars near the tub, and using night-lights. ? Clearing your home so that walkways are free of anything you might trip on.  ? Letting family and friends know that you have been feeling dizzy. This will help them know how to help you. When should you call for help? Call 911 anytime you think you may need emergency care. For example, call if:    · You passed out (lost consciousness). · You have dizziness along with symptoms of a heart attack. These may include:  ? Chest pain or pressure, or a strange feeling in the chest.  ? Sweating. ? Shortness of breath. ? Nausea or vomiting.   ? Pain, pressure,

## 2021-05-02 NOTE — PROGRESS NOTES
Fanny Dietrich   1970, 44 y.o. male   [id number]       Referring Provider: Viola Martinez MD  Referral Type: In an order in [employer]    Reason for Visit: Determination of the effect of medication, surgery, or other treatment. ADULT AUDIOLOGIC EVALUATION      Fanny Dietrich is a 44 y.o. male seen today, 5/6/2021 , for an recheck audiologic evaluation. Previous evaluation from 4/29/2021 viewable in medical record. Patient was seen by Viola Martinez MD following today's evaluation. AUDIOLOGIC AND OTHER PERTINENT MEDICAL HISTORY:      Fanny Dietrich noted no change in hearing or tinnitus of the left ear. Per previous evaluation had onset of dizziness 2 weeks ago accompanied by nausea and vomiting lasting for hours with prior history of dizziness 9-10 years ago. No additional changes to otologic or medical health since previous evaluation were reported.     Fanny Dietrich denied otalgia, aural fullness, otorrhea, history of falls, history of occupational/recreational noise exposure, history of head trauma, history of ear surgery and family history of hearing loss. Date: 5/6/2021     IMPRESSIONS:      Today's results revealed an asymmetric sensorineural hearing loss, bilaterally. Asymmetries > 10 dBHL noted from 250-8000Hz with left ear worse than right. Air conduction threshold stable, bilaterally and improvement in word recognition of the left ear from 80% to 96% compared to previous evaluation. Follow medical recommendations of Viola Martinez MD.     ASSESSMENT AND FINDINGS:     Otoscopy revealed: Clear ear canals bilaterally    RIGHT EAR:  Hearing Sensitivity: Within normal limits through 1kHz sloping to a mild to moderate sensorineural hearing loss through [facility]. Speech Recognition Threshold: 20 dB HL  Word Recognition: Excellent (96%), based on NU-6 25-word list at 60 dBHL using recorded speech stimuli.     Tympanometry: Normal peak pressure and compliance, Type A tympanogram, consistent with normal middle ear function. LEFT EAR:  Hearing Sensitivity: Moderately-severe rising to moderate sensorineural hearing loss through 3kHz sloping to a moderately-severe sensorineural hearing loss from 4-8kHz. Speech Recognition Threshold: 40 dB HL  Word Recognition: Excellent (96%), based on NU-6 25-word list at 85 dBHL masked using recorded speech stimuli. Tympanometry: Normal peak pressure and compliance, Type A tympanogram, consistent with normal middle ear function. Reliability: Good  Transducer: Inserts    **NOTE:  Asymmetries > 10 dBHL noted from 250-8000Hz with left ear worse than right. Air conduction threshold stable, bilaterally and improvement in word recognition of the left ear from 80% to 96% compared to previous evaluation. See scanned audiogram dated 5/6/2021  for results. PATIENT EDUCATION:       The following items were discussed with the patient:    - Good Communication Strategies  - Tinnitus Management Strategies     - Dizziness    Educational information was shared in the After Visit Summary. RECOMMENDATIONS:                                                                                                                                                                                                                                                            The following items are recommended based on patient report and results from today's appointment:   - Continue medical follow-up with Imani Little MD.   - Retest hearing as medically indicated and/or sooner if a change in hearing is noted. - Utilize \"Good Communication Strategies\" as discussed to assist in speech understanding with communication partners. - Maintain a sound enriched environment to assist in the management of tinnitus symptoms. - If medically indicated, consider vestibular evaluation to further investigate symptoms of dizziness.        Olman Simmons, AuD  Audiologist    Chart CC'd to: Ildefonso Davila MD      Degree of   Hearing Sensitivity dB Range   Within Normal Limits (WNL) 0 - 20   Mild 20 - 40   Moderate 40 - 55   Moderately-Severe 55 - 70   Severe 70 - 90   Profound 90 +

## 2021-05-06 ENCOUNTER — PROCEDURE VISIT (OUTPATIENT)
Dept: AUDIOLOGY | Age: 51
End: 2021-05-06
Payer: COMMERCIAL

## 2021-05-06 ENCOUNTER — OFFICE VISIT (OUTPATIENT)
Dept: ENT CLINIC | Age: 51
End: 2021-05-06
Payer: COMMERCIAL

## 2021-05-06 VITALS — WEIGHT: 152 LBS | TEMPERATURE: 98.9 F | HEIGHT: 65 IN | BODY MASS INDEX: 25.33 KG/M2

## 2021-05-06 DIAGNOSIS — R42 DIZZINESS: ICD-10-CM

## 2021-05-06 DIAGNOSIS — H91.22 SUDDEN LEFT HEARING LOSS: ICD-10-CM

## 2021-05-06 DIAGNOSIS — H90.A22 SENSORINEURAL HEARING LOSS (SNHL) OF LEFT EAR WITH RESTRICTED HEARING OF RIGHT EAR: ICD-10-CM

## 2021-05-06 DIAGNOSIS — R42 VERTIGO: ICD-10-CM

## 2021-05-06 DIAGNOSIS — H90.3 SENSORINEURAL HEARING LOSS, BILATERAL: Primary | ICD-10-CM

## 2021-05-06 DIAGNOSIS — H93.13 TINNITUS, BILATERAL: ICD-10-CM

## 2021-05-06 PROCEDURE — 69801 INCISE INNER EAR: CPT | Performed by: OTOLARYNGOLOGY

## 2021-05-06 PROCEDURE — 92567 TYMPANOMETRY: CPT | Performed by: AUDIOLOGIST

## 2021-05-06 PROCEDURE — 99024 POSTOP FOLLOW-UP VISIT: CPT | Performed by: OTOLARYNGOLOGY

## 2021-05-06 PROCEDURE — 92557 COMPREHENSIVE HEARING TEST: CPT | Performed by: AUDIOLOGIST

## 2021-05-06 RX ORDER — MECLIZINE HYDROCHLORIDE 25 MG/1
25 TABLET ORAL 3 TIMES DAILY PRN
Qty: 30 TABLET | Refills: 0 | Status: SHIPPED | OUTPATIENT
Start: 2021-05-06 | End: 2021-05-16

## 2021-05-06 RX ORDER — DEXAMETHASONE SODIUM PHOSPHATE 4 MG/ML
10 INJECTION, SOLUTION INTRA-ARTICULAR; INTRALESIONAL; INTRAMUSCULAR; INTRAVENOUS; SOFT TISSUE ONCE
Status: SHIPPED | OUTPATIENT
Start: 2021-05-06

## 2021-05-06 NOTE — PROGRESS NOTES
[address] [facility] SURGERY  NEW PATIENT HISTORY AND PHYSICAL NOTE      Patient Name: Cristiano Manrique  Medical Record Number:  [id number]  Primary Care Physician:  CINDY Baez - WENDI    ChiefComplaint     Chief Complaint   Patient presents with    Hearing Problem     Patient is here to follow up on a left ear sudden hearing loss and dizziness. He states his dizziness is improving, but his hearing has not changed. History of Present Illness     Cristiano Manrique is an 44 y.o. male with prior diagnosis of Ménière's disease, with sudden hearing loss accompanied by roaring tinnitus and monico vertigo on 4/22/2021. He denies any recent head trauma, upper respiratory infections; states that since the season has changes he has had significantly worse tinnitus in the left ear. His vertigo lasted approximately 2-3 hours, and has since resolved although he does have intermittent disequilibrium along with left ear fullness. His hearing loss has persisted, along with significant high-pitched, nonpulsatile fluctuating tinnitus in the left ear. No otalgia, otorrhea. At baseline, prior to this incident, he had worse hearing in the left ear along with tinnitus. No history of head trauma, chronic middle ear disease or ear surgery. No family history of early hearing loss. Was diagnosed with Ménière's disease more than 10 years ago after an episode of vertigo that lasted several hours-did not have roaring tinnitus or hearing loss at that time. Interval History 5/6/2021: No improvement in hearing loss, disequilibrium has been improving with meclizine. No otalgia, otorrhea, ear fullness. Has completed 60 mg high-dose course of prednisone for the past week. Of interest, he is able to work however feels he needs to place a plug in his right ear, as the asymmetry in appreciating sound from his right auditory field is distracting.     Past Medical History     Past Medical History: Diagnosis Date    H/O Meniere's disease     Rectal exam 11/26/2010       Past Surgical History     Past Surgical History:   Procedure Laterality Date    APPENDECTOMY      SHOULDER SURGERY         Family History     Family History   Problem Relation Age of Onset    Breast Cancer Mother        Social History     Social History     Tobacco Use    Smoking status: Current Every Day Smoker     Packs/day: 0.30     Types: Cigarettes    Smokeless tobacco: Never Used   Substance Use Topics    Alcohol use: Yes     Alcohol/week: 3.3 standard drinks     Types: 4 Standard drinks or equivalent per week    Drug use: No        Allergies     No Known Allergies    Medications     Current Outpatient Medications   Medication Sig Dispense Refill    meclizine (ANTIVERT) 25 MG tablet Take 1 tablet by mouth 3 times daily as needed for Dizziness 30 tablet 0    predniSONE (DELTASONE) 20 MG tablet Take 3 tablets by mouth daily for 7 days, THEN 2.5 tablets daily for 2 days, THEN 2 tablets daily for 2 days, THEN 1.5 tablets daily for 2 days, THEN 1 tablet daily for 2 days, THEN 0.5 tablets daily for 2 days. 36 tablet 0    meclizine (ANTIVERT) 12.5 MG tablet Take 1 tablet by mouth 3 times daily as needed for Dizziness 30 tablet 0    ondansetron (ZOFRAN) 4 MG tablet Take 1 tablet by mouth every 8 hours as needed for Nausea 10 tablet 0    Ascorbic Acid (VITAMIN C) 250 MG tablet Take 250 mg by mouth daily.  Multiple Vitamins-Minerals (THERAPEUTIC MULTIVITAMIN-MINERALS) tablet Take 1 tablet by mouth daily.        Current Facility-Administered Medications   Medication Dose Route Frequency Provider Last Rate Last Admin    dexamethasone (DECADRON) injection 10 mg  10 mg Intramuscular Once Thomas Poole MD           Review of Systems     REVIEW OF SYSTEMS  The following systems were reviewed and revealed the following in addition to any already discussed in the HPI:    CONSTITUTIONAL: No weight loss, no fever, no night sweats, no op: Same  Procedure : Instillation of dexamethasone 10mg/mL into the left middle ear   Surgeon: ARIS Jorgensen  Estimated Blood Loss: None    Procedure:   1. Diagnostic binocular otomicroscopy  2. Instillation of steroid into the left middle ear space    Risks and benefits of steroid instillation include pain, inflammation, infection, otorrhea, retained eardrum perforation; off-label use of steroid was discussed - patient agreed to proceed. After obtaining consent, the patient was placed in the examination chair in the reclined position.      -Left ear: External auditory canal clear, tympanic membrane without retractions/perforation - anterior superior quadrant painted with phenol and a 25-gauge needle advanced into the middle ear. Steroid was instilled into the right ear, approximately 1cc delivered, with visualization of fluid meniscus  in the middle ear over the incudostapedial joint appreciated. The patient was then placed supine with the neck extended and head turned 45-degrees to the contralateral ear for 20-30 minutes     * Patient tolerated the procedure well with no complications    Assessment and Plan     1. Sudden left hearing loss  Patient with sudden hearing loss with losses in the low frequencies on the left side. May represent Ménière's disease, or another vestibular cochlear pathology-we have treated him with high-dose steroids for 1 week. Today his word recognition has significantly improved, although pure tone audiometry remains poor. 2. Vertigo  Likely due to vestibular neuritis, may also represent Ménière's disease-we started the patient on high-dose steroids, will also represcribe meclizine for symptomatic relief. Return in about 1 week (around 5/13/2021).     Britton Hampton MD  [facility]  Department of Otolaryngology/Head and Neck Surgery  5/6/21    I have performed a head and neck physical exam personally or was physically present during the key or critical portions of the service. Medical Decision Making: The following items were considered in medical decision making:  Independent review of images  Review / order clinical lab tests  Review / order radiology tests  Decision to obtain old records  Review and summation of old records as accessed through [facility] (a summary of my findings in these old records: [id number] [facility] records from prior otolaryngology visits appreciated)     Portions of this note were dictated using Dragon.  There may be linguistic errors secondary to the use of this program.

## 2021-05-06 NOTE — PATIENT INSTRUCTIONS
Good Communication Strategies    Communication can be challenging for anyone, but can be especially difficult for those with some degree of hearing loss. While we may not be able to control every factor that may lead to difficulty with communication, there are Good Communication Strategies that we can all use in our day-to-day lives. Communication takes both parties working together for it to be successful. Tips as a Listener:   1. Control your environment. It is important to limit the amount of background noise in the room when possible. You should also consider having a good light source in the room to best see the other person. 2. Ask for clarification. Instead of saying \"What?\", you can use parts of what you heard to make a new question. For example, if you heard the word \"Thursday\" but not the rest of the week, you may ask \"What was that about Thursday? \" or \"What did you want to do Thursday? \". This shows the person talking that you are listening and will help them better explain what they are saying. 3. Be an advocate for yourself. If you are hearing but not understanding, tell the other person \"I can hear you, but I need you to slow down when you speak. \"  Or if someone is facing the other direction, say \"I cannot hear you when you are not looking at me when we talk. \"       Tips as a Talker:   - Sit or stand 3 to 6 feet away to maximize audibility         -- It is unrealistic to believe someone else will fully hear your message if you are speaking from across the room or in a different room in the house   - Stay at eye level to help with visual cues   - Make sure you have the persons attention before speaking   - Use facial expressions and gestures to accentuate your message   - Raise your voice slightly (do not scream)   - Speak slowly and distinctly   - Use short, simple sentences   - Rephrase your words if the person is having a hard time understanding you    - To avoid distortion, dont speak directly into a persons ear      Some additional items that may be helpful:   - Remain patient - this is important for both parties   - Write down items that still cannot be heard/understood. You may write with pen/paper or consider typing/texting on a cell phone or smart device. - If background noise is unavoidable, try to keep yourself in a good position in the room. By sitting at a vigil on the side of the restaurant (preferably a corner), it will be easier to communicate than if you were sitting at a table in the middle with background noise surrounding you. Keep yourself positioned away from music speakers or heavy foot traffic. Tinnitus: Overview and Management Strategies          Many people have some ringing sounds in their ears once in a while. You may hear a roar, a hiss, a tinkle, or a buzz. The sound usually lasts only a few minutes. If it goes on all the time, you may have tinnitus. Tinnitus is usually caused by long-term exposure to loud noise. This damages the nerves in the inner ear. It can occur with all types of hearing loss. It may be a symptom of almost any ear problem. Tinnitus may be caused by a buildup of earwax. Or, it may be caused by ear infections or certain medicines (especially antibiotics or large amounts of aspirin). You can also hear noises in your ears because of an injury to the ears, drinking too much alcohol or caffeine, or a medical condition. Other conditions may also contribute to tinnitus, including: head and neck trauma, temporomandibular joint disorder (TMJ), sinus pressure and barometric trauma, traumatic brain injury, metabolic disorders, autoimmune disorders, stress, and high blood pressure. You may need tests to evaluate your hearing and to find causes of long-lasting tinnitus. Your doctor may suggest one or more treatments to help you cope with the tinnitus. You can also do things at home to help reduce symptoms.     Follow-up care is a key part of your treatment and safety. Be sure to make and go to all appointments, and call your doctor if you are having problems. It's also a good idea to know your test results and keep a list of the medicines you take. How can you care for yourself at home? · Limit or cut out alcohol, caffeine, and sodium. They can make your symptoms worse. · Do not smoke or use other tobacco products. Nicotine reduces blood flow to the ear and makes tinnitus worse. If you need help quitting, talk to your doctor about stop-smoking programs and medicines. These can increase your chances of quitting for good. · Talk to your doctor about whether to stop taking aspirin and similar products such as ibuprofen or naproxen. · Get exercise often. It can help improve blood flow to the ear. Ways to manage/cope with tinnitus  Some tinnitus may last a long time. To manage your tinnitus, try to:  · Avoid noises that you think caused your tinnitus. If you can't avoid loud noises, wear earplugs or earmuffs. · Ignore the sound by paying attention to other things. Keeping your brain busy with other tasks or background noise can help your brain not focus on the tinnitus. · Try to not give the tinnitus an emotional reaction. Do your best to ignore the sound and not let it bother you. Relax using biofeedback, meditation, or yoga. Feeling stressed and being tired can make tinnitus worse. · Play music or white noise to help you sleep. Background noise may cover up the noise that you hear in your ears. You can buy a tabletop machine or a device that sits under your pillow to play soothing sounds, like ocean waves. · Smart phones have free apps, such as Whist, Relax Melodies, ReSound Relief, and White Noise Lite. These apps have different types of sounds/noise, some of which you can blend together to find sounds that are most soothing to you.   · Hearing aid technology, especially when there is some hearing loss, may help reduce tinnitus symptoms by giving your brain better access to the sounds it is missing. There are some hearing aids with built-in noise generator programs, which may help when amplification alone is not enough. Additional resources may be found through the [employer] at www.jessica.org    When should you call for help? Call 911 anytime you think you may need emergency care. For example, call if:    · You have symptoms of a stroke. These may include:  ? Sudden numbness, tingling, weakness, or loss of movement in your face, arm, or leg, especially on only one side of your body. ? Sudden vision changes. ? Sudden trouble speaking. ? Sudden confusion or trouble understanding simple statements. ? Sudden problems with walking or balance. ? A sudden, severe headache that is different from past headaches. Call your doctor now or seek immediate medical care if:    · You develop other symptoms. These may include hearing loss (or worse hearing loss), balance problems, dizziness, nausea, or vomiting. Watch closely for changes in your health, and be sure to contact your doctor if:    · Your tinnitus moves from both ears to one ear. · Your hearing loss gets worse within 1 day after an ear injury. · Your tinnitus or hearing loss does not get better within 1 week after an ear injury. · Your tinnitus bothers you enough that you want to take medicines to help you cope with it. If you notice changes in your tinnitus and/or your hearing, it is recommended that you have your hearing tested by your audiologist and to follow-up with your physician that manages your hearing loss (such as your ENT or Primary Care doctor). Dizziness: Care Instructions  Your Care Instructions  Dizziness is the feeling of unsteadiness or fuzziness in your head. It is different than having vertigo, which is a feeling that the room is spinning or that you are moving or falling.  It is also different from lightheadedness, which is the feeling that you are about to faint. It can be hard to know what causes dizziness. Some people feel dizzy when they have migraine headaches. Sometimes bouts of flu can make you feel dizzy. Some medical conditions, such as heart problems or high blood pressure, can make you feel dizzy. Many medicines can cause dizziness, including medicines for high blood pressure, pain, or anxiety. If a medicine causes your symptoms, your doctor may recommend that you stop or change the medicine. If it is a problem with your heart, you may need medicine to help your heart work better. If there is no clear reason for your symptoms, your doctor may suggest watching and waiting for a while to see if the dizziness goes away on its own. Follow-up care is a key part of your treatment and safety. Be sure to make and go to all appointments, and call your doctor if you are having problems. It's also a good idea to know your test results and keep a list of the medicines you take. How can you care for yourself at home? · If your doctor recommends or prescribes medicine, take it exactly as directed. Call your doctor if you think you are having a problem with your medicine. · Do not drive while you feel dizzy. · Try to prevent falls. Steps you can take include:  ? Using nonskid mats, adding grab bars near the tub, and using night-lights. ? Clearing your home so that walkways are free of anything you might trip on.  ? Letting family and friends know that you have been feeling dizzy. This will help them know how to help you. When should you call for help? Call 911 anytime you think you may need emergency care. For example, call if:    · You passed out (lost consciousness). · You have dizziness along with symptoms of a heart attack. These may include:  ? Chest pain or pressure, or a strange feeling in the chest.  ? Sweating. ? Shortness of breath. ? Nausea or vomiting.   ? Pain, pressure, or a strange feeling in the back, neck, jaw, or upper belly or in one or both shoulders or arms. ? Lightheadedness or sudden weakness. ? A fast or irregular heartbeat. · You have symptoms of a stroke. These may include:  ? Sudden numbness, tingling, weakness, or loss of movement in your face, arm, or leg, especially on only one side of your body. ? Sudden vision changes. ? Sudden trouble speaking. ? Sudden confusion or trouble understanding simple statements. ? Sudden problems with walking or balance. ? A sudden, severe headache that is different from past headaches. Call your doctor now or seek immediate medical care if:    · You feel dizzy and have a fever, headache, or ringing in your ears. · You have new or increased nausea and vomiting. · Your dizziness does not go away or comes back. Watch closely for changes in your health, and be sure to contact your doctor if:    · You do not get better as expected. Where can you learn more? Go to https://[email or web address].RhinoCyte. org and sign in to your Meiyou account. Enter J019 in the [employer] box to learn more about \"Dizziness: Care Instructions. \"     If you do not have an account, please click on the \"Sign Up Now\" link. Current as of: September 23, 2018  Content Version: 11.9  © 7461-2949 [employer]. Care instructions adapted under license by [facility] ([facility]). If you have questions about a medical condition or this instruction, always ask your healthcare professional. [place] [address] any warranty or liability for your use of this information.

## 2021-05-06 NOTE — Clinical Note
Dr. Nicole Guerrero,    Thank you for your referral for audiometric testing on this patient. Please see the scanned audiogram (under \"Media\" tab) and encounter note for details. If you have any questions, or if there is anything else you need, please let me know.       Cristiano Corbin  Audiologist  ---  [id number] [facility] - Audiology

## 2021-05-10 ENCOUNTER — TELEPHONE (OUTPATIENT)
Dept: ENT CLINIC | Age: 51
End: 2021-05-10

## 2021-05-10 ENCOUNTER — PROCEDURE VISIT (OUTPATIENT)
Dept: ENT CLINIC | Age: 51
End: 2021-05-10
Payer: COMMERCIAL

## 2021-05-10 VITALS
BODY MASS INDEX: 25.69 KG/M2 | TEMPERATURE: 99.1 F | SYSTOLIC BLOOD PRESSURE: 117 MMHG | HEART RATE: 88 BPM | WEIGHT: 154.4 LBS | DIASTOLIC BLOOD PRESSURE: 75 MMHG

## 2021-05-10 DIAGNOSIS — H91.22 SUDDEN LEFT HEARING LOSS: Primary | ICD-10-CM

## 2021-05-10 PROCEDURE — 69801 INCISE INNER EAR: CPT | Performed by: OTOLARYNGOLOGY

## 2021-05-10 RX ORDER — BUSPIRONE HYDROCHLORIDE 10 MG/1
TABLET ORAL
COMMUNITY
Start: 2020-10-08

## 2021-05-10 NOTE — PROGRESS NOTES
Initial mild improvement in hearing on the left side, however he states that has regressed. Continues to have bilateral tinnitus, worse in the left ear. Past Medical History     Past Medical History:   Diagnosis Date    H/O Meniere's disease     Rectal exam 11/26/2010       Past Surgical History     Past Surgical History:   Procedure Laterality Date    APPENDECTOMY      SHOULDER SURGERY         Family History     Family History   Problem Relation Age of Onset    Breast Cancer Mother        Social History     Social History     Tobacco Use    Smoking status: Current Every Day Smoker     Packs/day: 0.30     Types: Cigarettes    Smokeless tobacco: Never Used   Substance Use Topics    Alcohol use: Yes     Alcohol/week: 3.3 standard drinks     Types: 4 Standard drinks or equivalent per week    Drug use: No        Allergies     No Known Allergies    Medications     Current Outpatient Medications   Medication Sig Dispense Refill    busPIRone (BUSPAR) 10 MG tablet TAKE ONE TABLET BY MOUTH TWICE A DAY      meclizine (ANTIVERT) 25 MG tablet Take 1 tablet by mouth 3 times daily as needed for Dizziness 30 tablet 0    predniSONE (DELTASONE) 20 MG tablet Take 3 tablets by mouth daily for 7 days, THEN 2.5 tablets daily for 2 days, THEN 2 tablets daily for 2 days, THEN 1.5 tablets daily for 2 days, THEN 1 tablet daily for 2 days, THEN 0.5 tablets daily for 2 days. 36 tablet 0    ondansetron (ZOFRAN) 4 MG tablet Take 1 tablet by mouth every 8 hours as needed for Nausea 10 tablet 0    Ascorbic Acid (VITAMIN C) 250 MG tablet Take 250 mg by mouth daily.  Multiple Vitamins-Minerals (THERAPEUTIC MULTIVITAMIN-MINERALS) tablet Take 1 tablet by mouth daily.        Current Facility-Administered Medications   Medication Dose Route Frequency Provider Last Rate Last Admin    dexamethasone (DECADRON) injection 10 mg  10 mg Intramuscular Once Ana M Maria MD           Review of Systems     REVIEW OF SYSTEMS  The following systems were reviewed and revealed the following in addition to any already discussed in the HPI:    CONSTITUTIONAL: No weight loss, no fever, no night sweats, no chills  EYES: no vision changes, no blurry vision  EARS: no changes in hearing, no otalgia  NOSE: no epistaxis, no rhinorrhea  RESPIRATORY: No difficulty breathing, no shortness of breath  CV: no chest pain, no peripheral vascular disease  HEME: No coagulation disorder, no bleeding disorder  NEURO: No TIA or stroke-like symptoms  SKIN: No new rashes in the head and neck, no recent skin cancers  MOUTH: No new ulcers, no recent teeth infections  GASTROINTESTINAL: No diarrhea, stomach pain  PSYCH: No anxiety, no depression    PhysicalExam     Vitals:    05/10/21 1058   BP: 117/75   Site: Right Upper Arm   Position: Sitting   Cuff Size: Large Adult   Pulse: 88   Temp: 99.1 °F (37.3 °C)   TempSrc: Infrared   Weight: 154 lb 6.4 oz (70 kg)       PHYSICAL EXAM  /75 (Site: Right Upper Arm, Position: Sitting, Cuff Size: Large Adult)   Pulse 88   Temp 99.1 °F (37.3 °C) (Infrared)   Wt 154 lb 6.4 oz (70 kg)   BMI 25.69 kg/m²     GENERAL: No Acute Distress, Alert and Oriented, no hoarseness  EYES: EOMI, Anti-icteric.   No gaze-paretic nystagmus  NOSE: On anterior rhinoscopy there is no epistaxis, nasal mucosa within normal limits, no purulent drainage  EARS: Normal external appearance; on portable otomicroscopy:  -Right ear: External auditory canal without stenosis, tympanic membrane clear, no middle ear effusions or retractions  -Left ear: External auditory canal without stenosis, tympanic membrane clear, no middle ear effusions or retractions  FACE: 1/6 House-Brackmann Scale, symmetric, sensation equal bilaterally  ORAL CAVITY: 2 cm patch of leukoplakia over the right lateral tongue, uvula is midline, moist mucous membranes, 0+ tonsils, good dentition  NECK: Normal range of motion, no thyromegaly, trachea is midline, no lymphadenopathy, no neck masses, no crepitus  CHEST: Normal respiratory effort, no retractions, breathing comfortably  SKIN: No rashes, normal appearing skin, no evidence of skin lesions/tumors  NEURO: CN 2, 3, 4, 5, 6, 7, 11, 12 intact bilaterally     Data/Imaging Review            Procedure     Trans-tympanic steroid injection of the left ear     Pre op: Sudden hearing loss  Post op: Same  Procedure : Instillation of dexamethasone 10mg/mL into the left middle ear   Surgeon: ARIS Jorgensen  Estimated Blood Loss: None    Procedure:   1. Diagnostic binocular otomicroscopy  2. Instillation of steroid into the left middle ear space    Risks and benefits of steroid instillation include pain, inflammation, infection, otorrhea, retained eardrum perforation; off-label use of steroid was discussed - patient agreed to proceed. After obtaining consent, the patient was placed in the examination chair in the reclined position.      -Left ear: External auditory canal clear, tympanic membrane without retractions/perforation - anterior superior quadrant painted with phenol and a 25-gauge needle advanced into the middle ear. Steroid was instilled into the right ear, approximately 1cc delivered, with visualization of fluid meniscus  in the middle ear over the incudostapedial joint appreciated. The patient was then placed supine with the neck extended and head turned 45-degrees to the contralateral ear for 30 minutes     * Patient tolerated the procedure well with no complications    Assessment and Plan     1. Sudden left hearing loss  Patient with sudden hearing loss with losses in the low frequencies on the left side. May represent Ménière's disease, or another vestibular cochlear pathology-we have treated him with high-dose steroids for 1 week. At last visit his word recognition had significantly improved, although pure tone audiometry remains poor.   We effected left transtympanic steroid insufflation again at this visit today, and we will see him back 5/13/2021. At next visit if no improvement in hearing will plan for further transtympanic instillation of steroids, will order MRI, and discuss hearing aid evaluation. No follow-ups on file. Sarah Mccabe MD  [facility]  Department of Otolaryngology/Head and Neck Surgery  5/10/21    I have performed a head and neck physical exam personally or was physically present during the key or critical portions of the service. Medical Decision Making: The following items were considered in medical decision making:  Independent review of images  Review / order clinical lab tests  Review / order radiology tests  Decision to obtain old records  Review and summation of old records as accessed through [employer] (a summary of my findings in these old records: [id number] [facility] records from prior otolaryngology visits appreciated)     Portions of this note were dictated using Dragon.  There may be linguistic errors secondary to the use of this program.

## 2021-05-10 NOTE — TELEPHONE ENCOUNTER
Reached patient - he would like to schedule a clinic appointment for today, this afternoon. We will set him up for another trans tympanic steroid injection, as he believes this helped.

## 2021-05-10 NOTE — TELEPHONE ENCOUNTER
Patient called and wants to move his appointments from Thursday to today with Dr. Tracey Cleveland, due to the fact that he is not improving. Patient would like to talk to Dr. Tracey Cleveland. Call back number: [phone]    There is no audio available at [facility] ENT today (5/10/21).

## 2021-10-08 NOTE — PROGRESS NOTES
Conrado Vidales  [birth year]49 y.o. male   [id number]      HEARING AID EVALUATION    Conrado Vidales was seen today, 10/13/2021 , for a Hearing Aid Evaluation following audiologic evaluation on 5/6/21. Patient has no prior history of hearing aid use. Patient was found to have no insurance benefit for hearing aids. Patient is responsible for cost of devices. Hearing aid benefit worksheet scanned into media tab. DATE: 10/13/2021     PROCEDURES:     SOUNDFIELD TESTING:     Name of test Type of amplification Level of signal Level of noise % Correct        Nu-6  None 55dBHL N/A 76%        Nu-6  None 45dBHL N/A 64%    QuickSIN None 70dBHL varied 8.5 SNR loss       LIFESTYLE EVALUATION:      How do you spend most of your day? 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 Which ear do you use on the phone? Right         Land line or cell phone  Cell phone- iPhone     Do you feel like your hearing loss limits or hampers your personal or social life in any way? No    How do you compensate for things you miss or misunderstand? Ask to repeat     Does a hearing problem cause you to feel frustrated when talking to members of your family?  - to a degree      Do they get frustrated with you?  - no     How does it make you feel when you miss things?  - feels pretty logical and advocates     Does your hearing problem cause you difficulty when visiting friends, relatives, or neighbors? Yes    Does your hearing problem cause you difficulty when listening to a TV or radio? Yes    Do others feel that your TV/radio is too loud? Yes     Does a hearing problem cause you difficulty when in a restaurant with relatives or friends? - Yes    What % of conversation do you feel that you hear in groups/restaurants?   50%    When is your hearing loss most problematic?  - Conversation (adapts)     In what situation would you most like to hear better? 1. One on one (work)  2. Driving    Do you want to be able to connect directly to your phone with your hearing aids? Yes     After a discussion of evaluation results, discussion of levels of technology and prices, and lifestyle assessment, Drake Valles has decided to consider the options and contact Audiology when a decision has been made. .     Technology to be considered: Natasha Reason (technology and rechargeable TBD). Picked color P8,  power 2(M), small power dome for left and medium open for right. Patient cost due at time of fitting: TBD dependent on technology level selected. Patient is between premium and advanced technology quoted at $5,900.00 and $4,900.00; respectively. $300.00 subtracted from total for standard battery. PATIENT EDUCATION:      - Verbally reviewed benefits and limitations of devices and available features in hearing aids. - Verbally discussed realistic expectations of hearing aid use     Information was shared verbally with patient during appointment. RECOMMENDATIONS:      - Contact Audiology when a decision has been made to pursue hearing aids. No charge for today's appointment.       Cristiano Patricia  Audiologist

## 2021-10-13 ENCOUNTER — PROCEDURE VISIT (OUTPATIENT)
Dept: AUDIOLOGY | Age: 51
End: 2021-10-13

## 2021-10-13 DIAGNOSIS — H90.3 SENSORINEURAL HEARING LOSS, BILATERAL: Primary | ICD-10-CM

## 2021-10-13 DIAGNOSIS — H93.13 TINNITUS, BILATERAL: ICD-10-CM

## 2021-10-13 PROCEDURE — 99999 PR OFFICE/OUTPT VISIT,PROCEDURE ONLY: CPT | Performed by: AUDIOLOGIST

## 2021-12-13 ENCOUNTER — TELEPHONE (OUTPATIENT)
Dept: ENT CLINIC | Age: 51
End: 2021-12-13

## 2021-12-13 NOTE — TELEPHONE ENCOUNTER
Patient would like to speak to Dr. Chen Butt with questions about purchasing hearing aids.     Please call patient back when available [phone]

## 2021-12-17 ENCOUNTER — TELEPHONE (OUTPATIENT)
Dept: AUDIOLOGY | Age: 51
End: 2021-12-17

## 2021-12-17 NOTE — TELEPHONE ENCOUNTER
Per Dr. Cheko Solis the patients hearing aids had a shipping delay and should be here Monday late afternoon. So I canceled his appointment on Monday 12/20/2021 and rescheduled it for the same time on Tuesday 12/21/2021 (8:30am). If this doesn't work for the patient he is to call back and can be scheduled for another time on Tuesday or later in the week if need be.

## 2021-12-21 ENCOUNTER — PROCEDURE VISIT (OUTPATIENT)
Dept: AUDIOLOGY | Age: 51
End: 2021-12-21

## 2021-12-21 DIAGNOSIS — H93.13 TINNITUS, BILATERAL: ICD-10-CM

## 2021-12-21 DIAGNOSIS — H90.3 SENSORINEURAL HEARING LOSS, BILATERAL: Primary | ICD-10-CM

## 2021-12-21 PROCEDURE — V5020 CONFORMITY EVALUATION: HCPCS | Performed by: AUDIOLOGIST

## 2021-12-21 PROCEDURE — V5011 HEARING AID FITTING/CHECKING: HCPCS | Performed by: AUDIOLOGIST

## 2021-12-21 PROCEDURE — V5299 HEARING SERVICE: HCPCS | Performed by: AUDIOLOGIST

## 2021-12-21 PROCEDURE — V5160 DISPENSING FEE BINAURAL: HCPCS | Performed by: AUDIOLOGIST

## 2021-12-21 PROCEDURE — V5261 HEARING AID, DIGIT, BIN, BTE: HCPCS | Performed by: AUDIOLOGIST

## 2021-12-21 PROCEDURE — V5265 EAR MOLD/INSERT, DISP: HCPCS | Performed by: AUDIOLOGIST

## 2021-12-21 NOTE — PROGRESS NOTES
Pio Schmid   1970, 46 y.o. male   [id number]     HEARING AID FITTING      RIGHT EAR: /MODEL: Oticon Ruby2 miniRITE-R  SN: [id number]  EARMOLD/TUBING/WIRE/DOME: 2(85) with 8mm open bass domes    LEFT EAR: /MODEL: Oticon Ruby2 miniRITE-R  SN: [id number]  EARMOLD/TUBING/WIRE/DOME: 2(85) with 8mm double bass domes    HAF: 2021  WARRANTY: 2025  30 DAY RIGHT-TO-RETURN: 2022    BUTTONS: volume control activated  PROGRAMS: All-Around  PHONE CONNECTIVITY: devices connected to patient's iPhone      Pio Schmid was seen today, 2021   to be fit with Leona Bumpers miniRITE-R hearing aids. Patient fit with 2(85) receivers and 8mm open bass domes for the right and 8mm double bass dome for the left which appear to be a good fit. Played sound optimization clips to complete the personal profile questions. Programmed to acclimatization level 3. Counseled patient on use and care of devices and on realistic expectations. Device orientation was completed, includin. Hearing aid insertion/removal   2. Buttons/Indicators   3. How to charge devices     4. Cleaning/maintenance of the device     5. Loss & Damage/Repair warranty information   6. 30-day right-to-return period    Pio Binu noted good sound quality and comfort with hearing aids. Patient demonstrated proper insertion and removal of devices in office. Completed delivery statement and reviewed 30 day trial period and recall process for programming adjustments. PATIENT EDUCATION:       Pio Schmid was provided with the Hearing Aid Fitting Checklist as a reference of items discussed at today's appointment. Patient may find additional product information in the provided hearing aid  device manual.    Information was shared verbally with patient during appointment.        RECOMMENDATIONS:     - Return for follow-up appointment within 30-day right-to-return period.  - Continue wearing both HAs during all waking hours. - Retest hearing as medically indicated and/or sooner if a change in hearing is noted. - Contact audiologist with questions/concerns as needed      Patient paid total cost of $2,100.00.     Unbundled Billing- see associated fees and codes below:    Description Code Amount   Hearing Aids  $1,090.00   Dispensing Fee  $300.00   Fitting Fee (Non-Refundable)  $300.00   Earmold Non-Custom  x2 $50.00 x2 = $100.00   1-Year Service Plan  $250.00   Conformity Evaluation   (Real-Ear Measurements)  $60.00         Cristiano Zamora  Audiologist

## 2022-01-04 NOTE — PROGRESS NOTES
Delvin Dupont   1970, 46 y.o. male   [id number]     HEARING AID FITTING FOLLOW-UP    Delvin Dupont was seen today, 1/11/2022,  for first follow-up after being fit with [employer] miniRITE-R hearing aids. PROCEDURES:     Patient reported overall benefit with devices; however notices white noise sound in background and sometimes overwhelmed by background noise. Otoscopy revealed clear canals, AU. Live speech mapping was completed with a good match to NAL-NL2 targets from 250-8000 Hz, AU. After adjustments patient reported improved sound quality. Added P2 with tinnitus masker. Counseled on limitations of economy level technology. Patient reported understanding and agreed to try changes made at today's appointment before further exploring higher level technology. Reviewed cleaning and maintenance of hearing aids. Patient demonstrated she was able to change wax guards and domes in office. Patient Education:       - Verbally and visually reviewed care and maintenance of devices. - Reviewed Hearing Aid Fitting checklist and Troubleshooting document given to patient. Information was shared verbally with patient during appointment. RECOMMENDATIONS:     1. Continue wearing both HAs during all waking hours. 2. Return for follow-up as scheduled. 3. Retest hearing as medically indicated and/or sooner if a change in hearing is noted. 4. Contact audiologist with questions/concerns as needed    No charge for today's appointment.     Cristiano Jiang  Audiologist

## 2022-01-11 ENCOUNTER — PROCEDURE VISIT (OUTPATIENT)
Dept: AUDIOLOGY | Age: 52
End: 2022-01-11

## 2022-01-11 DIAGNOSIS — H90.3 SENSORINEURAL HEARING LOSS, BILATERAL: Primary | ICD-10-CM

## 2022-01-11 DIAGNOSIS — H93.13 TINNITUS, BILATERAL: ICD-10-CM

## 2022-01-11 PROCEDURE — 99999 PR OFFICE/OUTPT VISIT,PROCEDURE ONLY: CPT | Performed by: AUDIOLOGIST

## 2022-01-21 NOTE — PROGRESS NOTES
Leny Ny   1970, 46 y.o. male   [id number]     HEARING AID FITTING FOLLOW-UP    Date: 1/25/2022     Leny Ny was seen today, 1/25/2022 for final follow-up within 30 day right to return period after being fit with Dolores Hurleyfei miniRITE-R hearing aids. PROCEDURES:      Otoscopy revealed clear canals, AU. Patient noted benefit and improvement with changes made at last appointment; however notes background noise at work is sometimes particularly overwhelming especially in the left ear. Discussed possibility to upgrading to [employer]. Patient would like to continue to think about upgrade. Soundfield Testing  Name of test Type of amplification Level of signal Level of noise % Correct % Correct at HAE      Nu-6  Bilateral HAs 55dBHL N/A 96% 76%      Nu-6  Bilateral HAs 45dBHL N/A 88% 64%      QuickSIN Bilateral HAs 70dBHL varied 4.5 SNR loss 8.5 SNR loss   SFT results show patient is receiving good benefit from hearing aids and is meeting amplification goals. Patient reported understanding of soundfield results and left with no further questions. PATIENT EDUCATION:      - Verbally and visually reviewed importance of consistent use and care and maintenance of devices. Information was verbally shared with patient during appointment. RECOMMENDATIONS:      1. Continue wearing both HAs during all waking hours. 2. Retest hearing as medically indicated and/or sooner if a change in hearing is noted. 3. HAC every 3-4 months as scheduled. 4. Contact audiologist with questions/concerns as needed. No charge for today's appointment.      Cristiano Shi  Audiologist

## 2022-01-25 ENCOUNTER — PROCEDURE VISIT (OUTPATIENT)
Dept: AUDIOLOGY | Age: 52
End: 2022-01-25

## 2022-01-25 DIAGNOSIS — H90.3 SENSORINEURAL HEARING LOSS, BILATERAL: Primary | ICD-10-CM

## 2022-01-25 DIAGNOSIS — H93.13 TINNITUS, BILATERAL: ICD-10-CM

## 2022-01-25 PROCEDURE — 99999 PR OFFICE/OUTPT VISIT,PROCEDURE ONLY: CPT | Performed by: AUDIOLOGIST

## 2022-01-27 ENCOUNTER — OFFICE VISIT (OUTPATIENT)
Dept: ENT CLINIC | Age: 52
End: 2022-01-27
Payer: COMMERCIAL

## 2022-01-27 VITALS
WEIGHT: 150 LBS | DIASTOLIC BLOOD PRESSURE: 78 MMHG | TEMPERATURE: 98.8 F | SYSTOLIC BLOOD PRESSURE: 121 MMHG | BODY MASS INDEX: 24.99 KG/M2 | HEIGHT: 65 IN

## 2022-01-27 DIAGNOSIS — H81.02 MENIERE'S DISEASE OF LEFT EAR: Primary | ICD-10-CM

## 2022-01-27 DIAGNOSIS — H90.3 ASYMMETRIC SNHL (SENSORINEURAL HEARING LOSS): ICD-10-CM

## 2022-01-27 DIAGNOSIS — K13.21 ORAL LEUKOPLAKIA: ICD-10-CM

## 2022-01-27 PROCEDURE — 99214 OFFICE O/P EST MOD 30 MIN: CPT | Performed by: OTOLARYNGOLOGY

## 2022-01-27 RX ORDER — MECLIZINE HCL 12.5 MG/1
12.5 TABLET ORAL 3 TIMES DAILY PRN
COMMUNITY
End: 2022-01-27 | Stop reason: DRUGHIGH

## 2022-01-27 RX ORDER — TRIAMTERENE AND HYDROCHLOROTHIAZIDE 37.5; 25 MG/1; MG/1
1 CAPSULE ORAL DAILY
Qty: 30 CAPSULE | Refills: 3 | Status: SHIPPED
Start: 2022-01-27 | End: 2022-01-27 | Stop reason: SDUPTHER

## 2022-01-27 RX ORDER — MECLIZINE HYDROCHLORIDE 25 MG/1
25 TABLET ORAL 3 TIMES DAILY PRN
Qty: 30 TABLET | Refills: 0 | Status: SHIPPED | OUTPATIENT
Start: 2022-01-27 | End: 2022-02-06

## 2022-01-27 RX ORDER — TRIAMTERENE AND HYDROCHLOROTHIAZIDE 37.5; 25 MG/1; MG/1
1 CAPSULE ORAL DAILY
Qty: 30 CAPSULE | Refills: 3 | Status: SHIPPED | OUTPATIENT
Start: 2022-01-27 | End: 2022-05-18

## 2022-01-27 NOTE — PROGRESS NOTES
[facility] [address] [facility]  NEW PATIENT HISTORY AND PHYSICAL NOTE      Patient Name: Javier Perera  Medical Record Number:  [id number]  Primary Care Physician:  CINDY Sepulveda CNP    ChiefComplaint     Chief Complaint   Patient presents with    Follow-up     Tinnitus, balance issues. Patient often has to leave work due to feeling dizzy. Patient states it is so bad that he can not drive, has trouble walking. History of Present Illness     Javier Perera is an 46 y.o. male with prior diagnosis of Ménière's disease, with sudden hearing loss accompanied by roaring tinnitus and monico vertigo on 4/22/2021. He denies any recent head trauma, upper respiratory infections; states that since the season has changes he has had significantly worse tinnitus in the left ear. His vertigo lasted approximately 2-3 hours, and has since resolved although he does have intermittent disequilibrium along with left ear fullness. His hearing loss has persisted, along with significant high-pitched, nonpulsatile fluctuating tinnitus in the left ear. No otalgia, otorrhea. At baseline, prior to this incident, he had worse hearing in the left ear along with tinnitus. No history of head trauma, chronic middle ear disease or ear surgery. No family history of early hearing loss. Was diagnosed with Ménière's disease more than 10 years ago after an episode of vertigo that lasted several hours-did not have roaring tinnitus or hearing loss at that time. Interval History 5/6/2021: No improvement in hearing loss, disequilibrium has been improving with meclizine. No otalgia, otorrhea, ear fullness. Has completed 60 mg high-dose course of prednisone for the past week. Of interest, he is able to work however feels he needs to place a plug in his right ear, as the asymmetry in appreciating sound from his right auditory field is distracting.     Interval History 1/27/2022: Since 05/2021, he has obtained hearing aids with improved tinnitus and good rehabilitation. However, continues to have vertiginous episodes - approximately 3 episodes in the past 9 months - most recently 3 weeks ago, lasting 2-4 hours, no scotomas or headache, no increased hearing loss, no nausea or emesis, no worsened roaring in the left ear - treats with meclizine, rest.     Has cut down on EtOH (2 drinks/week), cut back on smoking (5/day), caffeine (B12 shot throughout the day - 5 hour energy, 3 cups/week coffee), drinking \"a lot of water\". Past Medical History     Past Medical History:   Diagnosis Date    H/O Meniere's disease     Rectal exam 11/26/2010       Past Surgical History     Past Surgical History:   Procedure Laterality Date    APPENDECTOMY      SHOULDER SURGERY         Family History     Family History   Problem Relation Age of Onset    Breast Cancer Mother        Social History     Social History     Tobacco Use    Smoking status: Current Every Day Smoker     Packs/day: 0.25     Years: 36.00     Pack years: 9.00     Types: Cigarettes     Start date: 1986    Smokeless tobacco: Never Used   Substance Use Topics    Alcohol use: Yes     Alcohol/week: 3.3 standard drinks     Types: 4 Standard drinks or equivalent per week    Drug use: No        Allergies     No Known Allergies    Medications     Current Outpatient Medications   Medication Sig Dispense Refill    triamterene-hydroCHLOROthiazide (DYAZIDE) 37.5-25 MG per capsule Take 1 capsule by mouth daily 30 capsule 3    meclizine (ANTIVERT) 25 MG tablet Take 1 tablet by mouth 3 times daily as needed for Dizziness 30 tablet 0    busPIRone (BUSPAR) 10 MG tablet TAKE ONE TABLET BY MOUTH TWICE A DAY      Ascorbic Acid (VITAMIN C) 250 MG tablet Take 250 mg by mouth daily.        Current Facility-Administered Medications   Medication Dose Route Frequency Provider Last Rate Last Admin    dexamethasone (DECADRON) injection 10 mg  10 mg IntraMUSCular Once [employer] Tomas Holman MD           Review of Systems     REVIEW OF SYSTEMS  The following systems were reviewed and revealed the following in addition to any already discussed in the HPI:    CONSTITUTIONAL: No weight loss, no fever, no night sweats, no chills  EYES: no vision changes, no blurry vision  EARS: no changes in hearing, no otalgia  NOSE: no epistaxis, no rhinorrhea  RESPIRATORY: No difficulty breathing, no shortness of breath  CV: no chest pain, no peripheral vascular disease  HEME: No coagulation disorder, no bleeding disorder  NEURO: No TIA or stroke-like symptoms  SKIN: No new rashes in the head and neck, no recent skin cancers  MOUTH: No new ulcers, no recent teeth infections  GASTROINTESTINAL: No diarrhea, stomach pain  PSYCH: No anxiety, no depression    PhysicalExam     Vitals:    01/27/22 1038   BP: 121/78   Site: Right Upper Arm   Position: Sitting   Temp: 98.8 °F (37.1 °C)   Weight: 150 lb (68 kg)   Height: 5' 5\" (1.651 m)       PHYSICAL EXAM  /78 (Site: Right Upper Arm, Position: Sitting)   Temp 98.8 °F (37.1 °C)   Ht 5' 5\" (1.651 m)   Wt 150 lb (68 kg)   BMI 24.96 kg/m²     GENERAL: No Acute Distress, Alert and Oriented, no hoarseness  EYES: EOMI, Anti-icteric. No gaze-paretic nystagmus. No corrective saccade on head thrust testing.    NOSE: On anterior rhinoscopy there is no epistaxis, nasal mucosa within normal limits, no purulent drainage  EARS: Normal external appearance; on portable otomicroscopy:  -Right ear: External auditory canal without stenosis, tympanic membrane clear, no middle ear effusions or retractions  -Left ear: External auditory canal without stenosis, tympanic membrane clear, no middle ear effusions or retractions  FACE: 1/6 House-Brackmann Scale, symmetric, sensation equal bilaterally  ORAL CAVITY: 2 cm patch of leukoplakia over the right lateral tongue appears improved, uvula is midline, moist mucous membranes, 0+ tonsils, good dentition  NECK: Normal range of motion, no thyromegaly, trachea is midline, no lymphadenopathy, no neck masses, no crepitus  CHEST: Normal respiratory effort, no retractions, breathing comfortably  SKIN: No rashes, normal appearing skin, no evidence of skin lesions/tumors  NEURO: CN 2, 3, 4, 5, 6, 7, 11, 12 intact bilaterally     Data/Imaging Review             Procedure       Assessment and Plan      Diagnosis Orders   1. Meniere's disease of left ear  triamterene-hydroCHLOROthiazide (DYAZIDE) 37.5-25 MG per capsule    meclizine (ANTIVERT) 25 MG tablet    DISCONTINUED: triamterene-hydroCHLOROthiazide (DYAZIDE) 37.5-25 MG per capsule   2. Asymmetric SNHL (sensorineural hearing loss)     3. Oral leukoplakia       49-year-old male initially seen for left ear sudden sensorineural hearing loss. Prior diagnosis of Ménière's disease, and in the past 9 months has had approximately 3 episodes of vertigo lasting 2-4 hours at a time. Has been following a low-salt diet, decreasing caffeine/tobacco use. MRI 12/27/2021 without findings. Given concern for Ménière's disease, we we will increase his meclizine dosing when he has episodes, and have discussed starting Dyazide which he is in agreement. He is currently applying for FMLA which is being completed by his primary care physician-we believe that Ménière's disease represents a chronic illness with intermittent acute flareups. He also has right tongue leukoplakia, prior history of tobacco use, but this appears improved at current visit. We will see him back in 6 months    Return in about 6 months (around 7/27/2022). Nichol Tejeda MD  [facility]  Department of Otolaryngology/Head and Neck Surgery  1/27/22    I have performed a head and neck physical exam personally or was physically present during the key or critical portions of the service. Medical Decision Making:   The following items were considered in medical decision making:  Independent review of images  Review / order clinical lab tests  Review / order radiology tests  Decision to obtain old records  Review and summation of old records as accessed through [address] [place] [address] [address] (a summary of my findings in these old records: None-no records from prior otolaryngology visits appreciated)     Portions of this note were dictated using Dragon.  There may be linguistic errors secondary to the use of this program.

## 2022-02-10 ENCOUNTER — CLINICAL DOCUMENTATION (OUTPATIENT)
Dept: AUDIOLOGY | Age: 52
End: 2022-02-10

## 2022-02-10 NOTE — PROGRESS NOTES
Patient called into the office due to Oticon hearing aids not connecting to his iPhone after upgrading. Assisted [employer] employee through pairing the iPhone and hearing aids. [employer] employee stated the hearing aids paired successfully.

## 2022-03-03 ENCOUNTER — OFFICE VISIT (OUTPATIENT)
Dept: ENT CLINIC | Age: 52
End: 2022-03-03
Payer: COMMERCIAL

## 2022-03-03 VITALS
DIASTOLIC BLOOD PRESSURE: 80 MMHG | SYSTOLIC BLOOD PRESSURE: 118 MMHG | BODY MASS INDEX: 24.99 KG/M2 | HEIGHT: 65 IN | WEIGHT: 150 LBS | TEMPERATURE: 98.2 F

## 2022-03-03 DIAGNOSIS — K13.21 LEUKOPLAKIA, TONGUE: ICD-10-CM

## 2022-03-03 DIAGNOSIS — H81.02 MENIERE'S DISEASE OF LEFT EAR: ICD-10-CM

## 2022-03-03 DIAGNOSIS — H93.13 TINNITUS, BILATERAL: ICD-10-CM

## 2022-03-03 DIAGNOSIS — H90.3 ASYMMETRIC SNHL (SENSORINEURAL HEARING LOSS): Primary | ICD-10-CM

## 2022-03-03 PROCEDURE — 99214 OFFICE O/P EST MOD 30 MIN: CPT | Performed by: OTOLARYNGOLOGY

## 2022-03-03 NOTE — PROGRESS NOTES
[address] [facility] SURGERY  NEW PATIENT HISTORY AND PHYSICAL NOTE      Patient Name: Michael Urbano  Medical Record Number:  [id number]  Primary Care Physician:  CINDY Green CNP    ChiefComplaint     Chief Complaint   Patient presents with    Other     Meniere's disease of left eat. Patient would like to discuss options. Patient did complete once session in the hyperbaric chamber       History of Present Illness     Michael Urbano is an 46 y.o. male with prior diagnosis of Ménière's disease, with sudden hearing loss accompanied by roaring tinnitus and monico vertigo on 4/22/2021. He denies any recent head trauma, upper respiratory infections; states that since the season has changes he has had significantly worse tinnitus in the left ear. His vertigo lasted approximately 2-3 hours, and has since resolved although he does have intermittent disequilibrium along with left ear fullness. His hearing loss has persisted, along with significant high-pitched, nonpulsatile fluctuating tinnitus in the left ear. No otalgia, otorrhea. At baseline, prior to this incident, he had worse hearing in the left ear along with tinnitus. No history of head trauma, chronic middle ear disease or ear surgery. No family history of early hearing loss. Was diagnosed with Ménière's disease more than 10 years ago after an episode of vertigo that lasted several hours-did not have roaring tinnitus or hearing loss at that time. Interval History 5/6/2021: No improvement in hearing loss, disequilibrium has been improving with meclizine. No otalgia, otorrhea, ear fullness. Has completed 60 mg high-dose course of prednisone for the past week. Of interest, he is able to work however feels he needs to place a plug in his right ear, as the asymmetry in appreciating sound from his right auditory field is distracting.     Interval History 1/27/2022: Since 05/2021, he has obtained hearing aids with improved tinnitus and good rehabilitation. However, continues to have vertiginous episodes - approximately 3 episodes in the past 9 months - most recently 3 weeks ago, lasting 2-4 hours, no scotomas or headache, no increased hearing loss, no nausea or emesis, no worsened roaring in the left ear - treats with meclizine, rest.     Has cut down on EtOH (2 drinks/week), cut back on smoking (5/day), caffeine (B12 shot throughout the day - 5 hour energy, 3 cups/week coffee), drinking \"a lot of water\". Interval History 3/3/2022: Tinnitus is still intrusive, prevents him from sleeping - believes it has worsened - slightly in the right ear as well. Buspirone helps with sleep, has had no vertiginous episodes since last visit. Has not started dyazide. No EtOH, cutting back on smoking (2 cigarettes/week), no caffeine, drinking water (1L/day). Has attempted hyperbaric     Past Medical History     Past Medical History:   Diagnosis Date    H/O Meniere's disease     Rectal exam 11/26/2010       Past Surgical History     Past Surgical History:   Procedure Laterality Date    APPENDECTOMY      SHOULDER SURGERY         Family History     Family History   Problem Relation Age of Onset    Breast Cancer Mother        Social History     Social History     Tobacco Use    Smoking status: Current Every Day Smoker     Years: 36.00     Types: Cigarettes     Start date: 1986    Smokeless tobacco: Never Used    Tobacco comment: Patient smokes 1 cigarette a day   Vaping Use    Vaping Use: Every day   Substance Use Topics    Alcohol use:  Yes     Alcohol/week: 3.3 standard drinks     Types: 4 Standard drinks or equivalent per week    Drug use: No        Allergies     No Known Allergies    Medications     Current Outpatient Medications   Medication Sig Dispense Refill    triamterene-hydroCHLOROthiazide (DYAZIDE) 37.5-25 MG per capsule Take 1 capsule by mouth daily 30 capsule 3    busPIRone (BUSPAR) 10 MG tablet TAKE ONE TABLET BY MOUTH TWICE A DAY      Ascorbic Acid (VITAMIN C) 250 MG tablet Take 250 mg by mouth daily. Current Facility-Administered Medications   Medication Dose Route Frequency Provider Last Rate Last Admin    dexamethasone (DECADRON) injection 10 mg  10 mg IntraMUSCular Once Imani Little MD           Review of Systems     REVIEW OF SYSTEMS  The following systems were reviewed and revealed the following in addition to any already discussed in the HPI:    CONSTITUTIONAL: No weight loss, no fever, no night sweats, no chills  EYES: no vision changes, no blurry vision  EARS: no changes in hearing, no otalgia  NOSE: no epistaxis, no rhinorrhea  RESPIRATORY: No difficulty breathing, no shortness of breath  CV: no chest pain, no peripheral vascular disease  HEME: No coagulation disorder, no bleeding disorder  NEURO: No TIA or stroke-like symptoms  SKIN: No new rashes in the head and neck, no recent skin cancers  MOUTH: No new ulcers, no recent teeth infections  GASTROINTESTINAL: No diarrhea, stomach pain  PSYCH: No anxiety, no depression    PhysicalExam     Vitals:    03/03/22 1456   BP: 118/80   Site: Left Upper Arm   Position: Sitting   Temp: 98.2 °F (36.8 °C)   Weight: 150 lb (68 kg)   Height: 5' 5\" (1.651 m)       PHYSICAL EXAM  /80 (Site: Left Upper Arm, Position: Sitting)   Temp 98.2 °F (36.8 °C)   Ht 5' 5\" (1.651 m)   Wt 150 lb (68 kg)   BMI 24.96 kg/m²     GENERAL: No Acute Distress, Alert and Oriented, no hoarseness  EYES: EOMI, Anti-icteric. No gaze-paretic nystagmus. No corrective saccade on head thrust testing.    NOSE: On anterior rhinoscopy there is no epistaxis, nasal mucosa within normal limits, no purulent drainage  EARS: Normal external appearance; on portable otomicroscopy:  -Right ear: External auditory canal without stenosis, tympanic membrane clear, no middle ear effusions or retractions  -Left ear: External auditory canal without stenosis, tympanic membrane clear, no middle ear effusions or retractions  FACE: 1/6 House-Brackmann Scale, symmetric, sensation equal bilaterally  ORAL CAVITY: 2 cm patch of leukoplakia over the right lateral tongue appears improved, uvula is midline, moist mucous membranes, 0+ tonsils, good dentition  NECK: Normal range of motion, no thyromegaly, trachea is midline, no lymphadenopathy, no neck masses, no crepitus  CHEST: Normal respiratory effort, no retractions, breathing comfortably  SKIN: No rashes, normal appearing skin, no evidence of skin lesions/tumors  NEURO: CN 2, 3, 4, 5, 6, 7, 11, 12 intact bilaterally             Data/Imaging Review             Procedure       Assessment and Plan      Diagnosis Orders   1. Asymmetric SNHL (sensorineural hearing loss)     2. Meniere's disease of left ear     3. Leukoplakia, tongue       68-year-old male initially seen for left ear sudden sensorineural hearing loss. Prior diagnosis of Ménière's disease, and in the past 9 months has had approximately 3 episodes of vertigo lasting 2-4 hours at a time. Has been following a low-salt diet, decreasing caffeine/tobacco use. MRI 12/27/2021 without findings. Given concern for Ménière's disease, we we will increase his meclizine dosing when he has episodes, and have discussed starting Dyazide however he did not pick this up. He also has right tongue leukoplakia, prior history of tobacco use, but this appears improved at current visit. He is concerned about his tinnitus, especially intrusive in the left ear, even after use of hearing aids-we discussed adjuvant medications such as nortriptyline however he would not like to take this given the risk of serotonin syndrome with his trazodone. We have also recommended cognitive behavioral therapy for tinnitus, which she states he is very interested and we will have him follow-up with us. He is currently undergoing hyperbaric oxygen therapy for tinnitus.     We will see him back in 6 months    Return in about 6 months (around 9/3/2022). Marysol Diaz MD  [facility]  Department of Otolaryngology/Head and Neck Surgery  3/3/22    I have performed a head and neck physical exam personally or was physically present during the key or critical portions of the service. Medical Decision Making: The following items were considered in medical decision making:  Independent review of images  Review / order clinical lab tests  Review / order radiology tests  Decision to obtain old records  Review and summation of old records as [facility] (a summary of my findings in these old records: [id number] [facility] records from prior otolaryngology visits appreciated)     Portions of this note were dictated using Dragon.  There may be linguistic errors secondary to the use of this program.

## 2022-03-03 NOTE — PATIENT INSTRUCTIONS
Cognitive Behavioral Therapy for Tinnitus    [facility] [address], [address], [address]   Phone: [phone]    Telehealth Options Available    If interested in nortriptyline for tinnitus, please call clinic

## 2022-03-04 ENCOUNTER — TELEPHONE (OUTPATIENT)
Dept: ENT CLINIC | Age: 52
End: 2022-03-04

## 2022-03-04 DIAGNOSIS — H93.19 SUBJECTIVE TINNITUS, UNSPECIFIED LATERALITY: Primary | ICD-10-CM

## 2022-03-04 NOTE — TELEPHONE ENCOUNTER
Patient called in asking if Dr. Gagandeep Beckham could recommend someone to do a cochlear implant for his tinnitus. Informed patient that Dr. Gagandeep Beckham is in surgery this morning and we might not get an answer right away. Patient did not realize Dr. Gagandeep Beckham did surgery and asked if Dr. Gagandeep Beckham could do the surgery. Informed patient we do not do cochlear implants but Dr. Gagandeep Beckham has done a BAHA before and I am not sure if he is eligable for this. The patient asked if someone else could assist in this as we might not get a response from Dr. Gagandeep Beckham until monday, informed patient that we do have another provider in the office but I am not sure if Dr. Edward Hua will have any suggestions as the patient is established with Dr. Gagandeep Beckham. The patient states yesterday at his appointment with Dr. Gagandeep Beckham that he was told by Dr. Gagandeep Beckham that sometimes patients get Cochlear implants to help with tinnitus. The patient states this is an urgent but not emergent matter and he would like an answer as soon as possible.

## 2022-03-07 NOTE — TELEPHONE ENCOUNTER
Patient placed call to this office advising that he is having severe ringing in his ears that is impacting his quality of life. Patient states that he has to have medication in order to sleep and to get through the day. Patient wants advice regarding what he can do, or should do to try to get this under control. Patient states that he would like to speak with Dr. Jarad Ham immediately. Patient states he is thinking about going to the ED. Please call patient back as soon as possible.      [phone]

## 2022-03-08 NOTE — TELEPHONE ENCOUNTER
I will refer him to Dr. Debra Holder at [facility] - please notify the patient and give him the referral number

## 2022-03-08 NOTE — TELEPHONE ENCOUNTER
Patient returned Dr. Wilkins Prom call. Advised patient of message below, as per Dr. Dominique Keane. Patient advises that the cognitive behavioral therapy \"isn't going to work\" and gave no response to Dr. Wilkins Prom offer to try the nortriptyline. Patient wants to know if there are other ENT's in the area that do the cochlear implants. Advised patient that writer was aware that  currently does this. Unsure of other practices that do. Patient advises that he is going to look for another doctor.

## 2022-03-08 NOTE — TELEPHONE ENCOUNTER
Called patient - left voicemail asking him to call the clinic back. If he calls back please notify him that he would likely not be a candidate for a cochlear implant for tinnitus, however we would like to explore other options - we referred him for cognitive behavioral therapy (has he started this?) and can prescribe nortriptyline if he is interested. If he has further questions please forward him to me.

## 2022-03-18 ENCOUNTER — TELEPHONE (OUTPATIENT)
Dept: AUDIOLOGY | Age: 52
End: 2022-03-18

## 2022-03-18 NOTE — TELEPHONE ENCOUNTER
Patient called stating he received a notification from the [employer] pasha indicating hearing aids are not connected to bluetooth. Patient is able to stream phone calls, music, and utilize the pasha.   Advised patient to disregard notification and will [employer] of notification patient received

## 2022-03-18 NOTE — TELEPHONE ENCOUNTER
Patient called in stating he is having issues with his hearing aids connecting. He asked to speak with Dr. Reyna Corea, informed him Dr. Reyna Corea has left for the day, he asked if anyone else is able to help. Spoke with Dr. Mansi Valenzuela and she was able to take the call.      I sent the call to Dr. Mansi Valenzuela

## 2022-03-31 ENCOUNTER — PROCEDURE VISIT (OUTPATIENT)
Dept: AUDIOLOGY | Age: 52
End: 2022-03-31

## 2022-03-31 DIAGNOSIS — H93.13 TINNITUS, BILATERAL: ICD-10-CM

## 2022-03-31 DIAGNOSIS — H90.3 SENSORINEURAL HEARING LOSS, BILATERAL: Primary | ICD-10-CM

## 2022-03-31 PROCEDURE — 99999 PR OFFICE/OUTPT VISIT,PROCEDURE ONLY: CPT | Performed by: AUDIOLOGIST

## 2022-03-31 NOTE — PROGRESS NOTES
Obie Nevarez  [birth year]66 y.o. male   [id number]    HEARING AID CHECK    Obie Nevarez was seen today, 3/31/2022, for a hearing aid check appointment    PROCEDURES:     Otoscopy revealed: Clear ear canals bilaterally    Patient noted overall benefit and consistent use of devices. Hearing aids were cleaned and checked. A listening check revealed good function of the devices. Microphone and  ports were suctioned, domes and wax traps were changed, and devices completed a desiccant cycle through [employer]. Post-cleaning listening check revealed good function of the devices. PATIENT EDUCATION:     - Verbally and visually reviewed importance of consistent use and care and maintenance of devices. Information was verbally shared with patient during appointment. RECOMMENDATIONS:     - Continue consistent hearing aid use  - Return for hearing aid checks as needed  - Retest hearing as medically indicated and/or sooner if a change in hearing is noted. - Contact audiologist with questions/concerns as needed    **No charge for today's appointment; patient under service warranty through 12/21/22.      Angie Cabot, AuD  Audiologist

## 2022-04-29 ENCOUNTER — TELEPHONE (OUTPATIENT)
Dept: ENT CLINIC | Age: 52
End: 2022-04-29

## 2022-04-29 NOTE — TELEPHONE ENCOUNTER
Call placed to patient to inform him that Dr. Te Fuller wants FMLA paperwork. Patient stated he would get this for our office. Provided patient with our fax number incase this paperwork will have to be sent straight to the office from his employer.

## 2022-05-10 ENCOUNTER — TELEPHONE (OUTPATIENT)
Dept: ENT CLINIC | Age: 52
End: 2022-05-10

## 2022-05-10 NOTE — TELEPHONE ENCOUNTER
Patient would like to know about free apps he can search for piano playing, says he has discussed this with Dr. Kathi Dunbar before and would like a call back when available please.     Patient call back [phone]

## 2022-05-12 ENCOUNTER — TELEPHONE (OUTPATIENT)
Dept: ENT CLINIC | Age: 52
End: 2022-05-12

## 2022-05-12 NOTE — TELEPHONE ENCOUNTER
Patient called in and states he was told by his work they sent FMLA paperwork to our office, informed patient I did not see any paperwork in his chart. The patient stated that he has a copy of the paperwork and there is a portion he needs to fill out. I asked the patient to fill out his portion and then bring the paperwork into the office so that I can get it scanned into his chart and have Dr. Amaya Varghese fill out his portion. Patient in agreement with this plan.

## 2022-05-13 ENCOUNTER — TELEPHONE (OUTPATIENT)
Dept: AUDIOLOGY | Age: 52
End: 2022-05-13

## 2022-05-13 NOTE — TELEPHONE ENCOUNTER
Patient called in and would like to know if Dr. Mary Anne Macias has heard anything about Vitamin B12 helping with tinnitus. He states he has done some reading and was told by a family friend that they take vitamin B12 and it has helped lessen their tinnitus. Informed patient I would be sending the message to Dr. Mica Vieyra as well as he might have some information on this. Patient expressed understanding.

## 2022-05-18 ENCOUNTER — OFFICE VISIT (OUTPATIENT)
Dept: ENT CLINIC | Age: 52
End: 2022-05-18
Payer: COMMERCIAL

## 2022-05-18 VITALS
WEIGHT: 150 LBS | SYSTOLIC BLOOD PRESSURE: 120 MMHG | HEIGHT: 65 IN | TEMPERATURE: 98.6 F | DIASTOLIC BLOOD PRESSURE: 73 MMHG | BODY MASS INDEX: 24.99 KG/M2

## 2022-05-18 DIAGNOSIS — H93.13 TINNITUS, BILATERAL: ICD-10-CM

## 2022-05-18 DIAGNOSIS — K13.21 LEUKOPLAKIA, TONGUE: Primary | ICD-10-CM

## 2022-05-18 DIAGNOSIS — H90.3 ASYMMETRIC SNHL (SENSORINEURAL HEARING LOSS): ICD-10-CM

## 2022-05-18 PROCEDURE — 99214 OFFICE O/P EST MOD 30 MIN: CPT | Performed by: OTOLARYNGOLOGY

## 2022-05-18 RX ORDER — ALPRAZOLAM 0.5 MG/1
0.5 TABLET ORAL 2 TIMES DAILY
COMMUNITY

## 2022-05-18 NOTE — PATIENT INSTRUCTIONS
-Follow up for repeat hearing test  -Call ENT with worsening tinnitus, vertigo, or if you wish to discuss further medication

## 2022-05-18 NOTE — PROGRESS NOTES
[address] [facility]  NEW PATIENT HISTORY AND PHYSICAL NOTE      Patient Name: Lin Pierre  Medical Record Number:  [id number]  Primary Care Physician:  CINDY Mccauley CNP    ChiefComplaint     Chief Complaint   Patient presents with    Follow-up     Patienmt would like to discuss symptoms and FMLa paperwork. He states he has missed work due to his Tinnitus and Menieres disease       History of Present Illness     Lin Pierre is an 46 y.o. male with prior diagnosis of Ménière's disease, with sudden hearing loss accompanied by roaring tinnitus and monico vertigo on 4/22/2021. He denies any recent head trauma, upper respiratory infections; states that since the season has changes he has had significantly worse tinnitus in the left ear. His vertigo lasted approximately 2-3 hours, and has since resolved although he does have intermittent disequilibrium along with left ear fullness. His hearing loss has persisted, along with significant high-pitched, nonpulsatile fluctuating tinnitus in the left ear. No otalgia, otorrhea. At baseline, prior to this incident, he had worse hearing in the left ear along with tinnitus. No history of head trauma, chronic middle ear disease or ear surgery. No family history of early hearing loss. Was diagnosed with Ménière's disease more than 10 years ago after an episode of vertigo that lasted several hours-did not have roaring tinnitus or hearing loss at that time. Interval History 5/6/2021: No improvement in hearing loss, disequilibrium has been improving with meclizine. No otalgia, otorrhea, ear fullness. Has completed 60 mg high-dose course of prednisone for the past week. Of interest, he is able to work however feels he needs to place a plug in his right ear, as the asymmetry in appreciating sound from his right auditory field is distracting.     Interval History 1/27/2022: Since 05/2021, he has obtained hearing aids with improved tinnitus and good rehabilitation. However, continues to have vertiginous episodes - approximately 3 episodes in the past 9 months - most recently 3 weeks ago, lasting 2-4 hours, no scotomas or headache, no increased hearing loss, no nausea or emesis, no worsened roaring in the left ear - treats with meclizine, rest.     Has cut down on EtOH (2 drinks/week), cut back on smoking (5/day), caffeine (B12 shot throughout the day - 5 hour energy, 3 cups/week coffee), drinking \"a lot of water\". Interval History 3/3/2022: Tinnitus is still intrusive, prevents him from sleeping - believes it has worsened - slightly in the right ear as well. Buspirone helps with sleep, has had no vertiginous episodes since last visit. Has not started dyazide. No EtOH, cutting back on smoking (2 cigarettes/week), no caffeine, drinking water (1L/day). Has attempted hyperbaric     Interval History 5/18/2022: Tinnitus continues to be intrusive, intermittent. No further vertigo/balance issues. Uses ear plug in the right ear allows him to focus less on the tinnitus in the left ear. Currently on alprazolam PRN, buspirone daily, vitamin C. Currently seeing a therapist and has had 4 sessions - using breathing exercises. Caffeine worsens tinnitus. Is interested in B12 therapy.      Past Medical History     Past Medical History:   Diagnosis Date    H/O Meniere's disease     Rectal exam 11/26/2010       Past Surgical History     Past Surgical History:   Procedure Laterality Date    APPENDECTOMY      SHOULDER SURGERY         Family History     Family History   Problem Relation Age of Onset    Breast Cancer Mother        Social History     Social History     Tobacco Use    Smoking status: Current Every Day Smoker     Years: 36.00     Types: Cigarettes     Start date: 1986    Smokeless tobacco: Never Used    Tobacco comment: Patient smokes 1 cigarette a day   Vaping Use    Vaping Use: Every day   Substance Use Topics    Alcohol use: Yes     Alcohol/week: 3.3 standard drinks     Types: 4 Standard drinks or equivalent per week    Drug use: No        Allergies     No Known Allergies    Medications     Current Outpatient Medications   Medication Sig Dispense Refill    ALPRAZolam (XANAX) 0.5 MG tablet Take 0.5 mg by mouth 2 times daily.  busPIRone (BUSPAR) 10 MG tablet TAKE ONE TABLET BY MOUTH TWICE A DAY      Ascorbic Acid (VITAMIN C) 250 MG tablet Take 250 mg by mouth daily. Current Facility-Administered Medications   Medication Dose Route Frequency Provider Last Rate Last Admin    dexamethasone (DECADRON) injection 10 mg  10 mg IntraMUSCular Once Mendez Mclean MD           Review of Systems     REVIEW OF SYSTEMS  The following systems were reviewed and revealed the following in addition to any already discussed in the HPI:    CONSTITUTIONAL: No weight loss, no fever, no night sweats, no chills  EYES: no vision changes, no blurry vision  EARS: no changes in hearing, no otalgia  NOSE: no epistaxis, no rhinorrhea  RESPIRATORY: No difficulty breathing, no shortness of breath  CV: no chest pain, no peripheral vascular disease  HEME: No coagulation disorder, no bleeding disorder  NEURO: No TIA or stroke-like symptoms  SKIN: No new rashes in the head and neck, no recent skin cancers  MOUTH: No new ulcers, no recent teeth infections  GASTROINTESTINAL: No diarrhea, stomach pain  PSYCH: No anxiety, no depression    PhysicalExam     Vitals:    05/18/22 1451   BP: 120/73   Site: Right Upper Arm   Position: Sitting   Temp: 98.6 °F (37 °C)   Weight: 150 lb (68 kg)   Height: 5' 5\" (1.651 m)       PHYSICAL EXAM  /73 (Site: Right Upper Arm, Position: Sitting)   Temp 98.6 °F (37 °C)   Ht 5' 5\" (1.651 m)   Wt 150 lb (68 kg)   BMI 24.96 kg/m²     GENERAL: No Acute Distress, Alert and Oriented, no hoarseness  EYES: EOMI, Anti-icteric. No gaze-paretic nystagmus. No corrective saccade on head thrust testing.    NOSE: On anterior rhinoscopy there is no epistaxis, nasal mucosa within normal limits, no purulent drainage  EARS: Normal external appearance; on portable otomicroscopy:  -Right ear: External auditory canal without stenosis, tympanic membrane clear, no middle ear effusions or retractions  -Left ear: External auditory canal without stenosis, tympanic membrane clear, no middle ear effusions or retractions  FACE: 1/6 House-Brackmann Scale, symmetric, sensation equal bilaterally  ORAL CAVITY: 2 cm patch of leukoplakia over the right lateral tongue appears improved-now only 2-4 mm, uvula is midline, moist mucous membranes, 0+ tonsils, good dentition  NECK: Normal range of motion, no thyromegaly, trachea is midline, no lymphadenopathy, no neck masses, no crepitus  CHEST: Normal respiratory effort, no retractions, breathing comfortably  SKIN: No rashes, normal appearing skin, no evidence of skin lesions/tumors  NEURO: CN 2, 3, 4, 5, 6, 7, 11, 12 intact bilaterally     Data/Imaging Review             Procedure       Assessment and Plan      Diagnosis Orders   1. Leukoplakia, tongue     2. Tinnitus, bilateral     3. Asymmetric SNHL (sensorineural hearing loss)       57-year-old male initially seen for left ear sudden sensorineural hearing loss. Prior diagnosis of Ménière's disease, however has not had any vertigo since significant cessation of caffeine, and obtaining hearing aids. He also has right tongue leukoplakia, prior history of tobacco use, but this appears improved at current visit. He states he is followed up by his dentist regularly. He is concerned about his tinnitus, especially intrusive in the left ear, even after use of hearing aids- is using masking applications, believes that alprazolam helps, has pursued CBT but this is not covered by his insurance. He is not currently interested in further pharmacotherapy, and may follow-up with us on an as-needed basis.   Given that his tinnitus occasionally prevents him from performing work duties, we will fill out [facility] paperwork documenting this. We recommend hearing test every year, and he will follow-up for this with our audiology department. No follow-ups on file. Cornelius Caicedo MD  [address] [place], [place] [address]  Department of Otolaryngology/Head and Neck Surgery  5/18/22    I have performed a head and neck physical exam personally or was physically present during the key or critical portions of the service. Medical Decision Making: The following items were considered in medical decision making:  Independent review of images  Review / order clinical lab tests  Review / order radiology tests  Decision to obtain old records  Review and summation of [facility] (a summary of my findings in these old records: [id number] [facility] records from prior otolaryngology visits appreciated)     Portions of this note were dictated using Dragon.  There may be linguistic errors secondary to the use of this program.

## 2022-05-24 ENCOUNTER — TELEPHONE (OUTPATIENT)
Dept: ENT CLINIC | Age: 52
End: 2022-05-24

## 2022-05-24 NOTE — TELEPHONE ENCOUNTER
Hello,  I did not put an end date for the condition as I'm not sure it will ever be resolved, please leave the paperwork on my desk and I can sign it. Did Barton Essex leave a number that I can call to discuss with them?

## 2022-05-24 NOTE — TELEPHONE ENCOUNTER
[employer] did not contact the office so I was unable to ask them for a number. Once we receive the fax I will place it on your desk for you to review.

## 2022-05-24 NOTE — TELEPHONE ENCOUNTER
Patient called in stating he got a call from [employer] that Dr. Flora Barnhart did not put an end date for the condition. The patient stated [facility] told him he needed to have Dr. Flora Barnhart put a date, initial, and fax this paperwork back to them.

## 2022-10-03 ENCOUNTER — PROCEDURE VISIT (OUTPATIENT)
Dept: AUDIOLOGY | Age: 52
End: 2022-10-03

## 2022-10-03 DIAGNOSIS — H90.3 SENSORINEURAL HEARING LOSS, BILATERAL: Primary | ICD-10-CM

## 2022-10-03 DIAGNOSIS — H93.13 TINNITUS, BILATERAL: ICD-10-CM

## 2022-10-03 PROCEDURE — 99999 PR OFFICE/OUTPT VISIT,PROCEDURE ONLY: CPT | Performed by: AUDIOLOGIST

## 2022-10-03 NOTE — PROGRESS NOTES
Whitney Burt  [birth year]35 y.o. male   [id number]    HEARING AID CHECK    Whitney Burt was seen today, 10/3/2022, for a hearing aid check appointment    PROCEDURES:     Otoscopy revealed: Clear ear canals bilaterally    Patient noted benefit with devices; however both devices have been intermittent in turning off/on. Hearing aids were cleaned and checked. A listening check revealed good function of the devices. Microphone and  ports were suctioned, domes and wax traps were changed, and devices completed a desiccant cycle through [employer]. Post-cleaning listening check revealed good function of the devices. Connected devices to fitting software. Recommended patient update pasha via iPhone. Patient reported understanding and will call if intermittency continues for in-warranty repair. PATIENT EDUCATION:     - Verbally and visually reviewed importance of consistent use and care and maintenance of devices. Information was verbally shared with patient during appointment. RECOMMENDATIONS:     Continue consistent hearing aid use  Return for hearing aid checks as needed  Retest hearing as medically indicated and/or sooner if a change in hearing is noted. Contact audiologist with questions/concerns as needed    **No charge for today's appointment; patient under service warranty through 12/21/22.      Cristiano Maier  Audiologist

## 2022-12-19 NOTE — PROGRESS NOTES
Katelin Orona  [birth year]80 y.o. male   [id number]    HEARING AID CHECK    Katelin Orona was seen today, 12/20/2022, for a hearing aid check appointment    PROCEDURES:     Otoscopy revealed: Clear ear canals bilaterally    Patient noted benefit with devices; states tinnitus will fluctuate from left to right. He notes use of sound machine for tinnitus at night. Hearing aids were cleaned and checked. A listening check revealed good function of the devices. Microphone and  ports were suctioned, domes and wax traps were changed, and devices completed a desiccant cycle through [employer]. Post-cleaning listening check revealed good function of the devices. Connected devices to fitting software. Datalogging revealed 14 hours of use per day. Discussed possibility of candidacy for tinnitus management program starting 2023. PATIENT EDUCATION:     - Verbally and visually reviewed importance of consistent use and care and maintenance of devices. Information was verbally shared with patient during appointment. RECOMMENDATIONS:     Continue consistent hearing aid use  Return for hearing aid checks as needed  Retest hearing as medically indicated and/or sooner if a change in hearing is noted. Contact audiologist with questions/concerns as needed    **No charge for today's appointment; patient under service warranty through 12/21/22.      Cristiano Carrizales  Audiologist

## 2022-12-20 ENCOUNTER — PROCEDURE VISIT (OUTPATIENT)
Dept: AUDIOLOGY | Age: 52
End: 2022-12-20

## 2022-12-20 DIAGNOSIS — H90.3 SENSORY HEARING LOSS, BILATERAL: Primary | ICD-10-CM

## 2022-12-20 DIAGNOSIS — H93.13 TINNITUS AURIUM, BILATERAL: ICD-10-CM

## 2022-12-20 PROCEDURE — 99999 PR OFFICE/OUTPT VISIT,PROCEDURE ONLY: CPT | Performed by: AUDIOLOGIST

## 2023-03-26 DIAGNOSIS — H81.02 MENIERE'S DISEASE OF LEFT EAR: ICD-10-CM

## 2023-03-28 ENCOUNTER — TELEPHONE (OUTPATIENT)
Dept: ENT CLINIC | Age: 53
End: 2023-03-28

## 2023-03-29 RX ORDER — MECLIZINE HYDROCHLORIDE 25 MG/1
TABLET ORAL
Qty: 30 TABLET | Refills: 0 | Status: SHIPPED | OUTPATIENT
Start: 2023-03-29

## 2023-05-02 ENCOUNTER — HOSPITAL ENCOUNTER (OUTPATIENT)
Dept: CT IMAGING | Age: 53
Discharge: HOME | End: 2023-05-02
Payer: COMMERCIAL

## 2023-05-02 DIAGNOSIS — S09.90XA INJURY OF HEAD, INITIAL ENCOUNTER: ICD-10-CM

## 2023-05-02 PROCEDURE — 70450 CT HEAD/BRAIN W/O DYE: CPT

## 2023-05-02 PROCEDURE — 6360000004 HC RX CONTRAST MEDICATION: Performed by: NURSE PRACTITIONER

## 2023-05-02 PROCEDURE — 70496 CT ANGIOGRAPHY HEAD: CPT

## 2023-05-02 RX ADMIN — IOPAMIDOL 75 ML: 755 INJECTION, SOLUTION INTRAVENOUS at 14:28

## 2023-05-08 ENCOUNTER — TELEPHONE (OUTPATIENT)
Dept: ENT CLINIC | Age: 53
End: 2023-05-08

## 2023-05-08 NOTE — TELEPHONE ENCOUNTER
Patient calls in with complaint of hearing aides \"stopping working\" an hour ago. Patient wants to know if there is a way to do a \"hard reset\" or fix them himself. Advised patient he would likely need to come in for an appointment. Assisted patient in scheduling an appointment for Wednesday, but patient states that he is \"panicking\" and would like to speak with someone today if possible.

## 2023-07-31 ENCOUNTER — TELEPHONE (OUTPATIENT)
Dept: ENT CLINIC | Age: 53
End: 2023-07-31

## 2023-07-31 NOTE — TELEPHONE ENCOUNTER
Patient called in wanting to be seen ASAP. Patient is scheduled on 8/2 @1pm in [facility] with Robin Tucker. patient does not want to wait this long for an appointment. Patient states his HA has completely stopped working and is causing severe balance issues.   Message sent to Robin Tucker and was recommended patient would be a great candidate for tinnitus program.      Patient would like a call back to try to troubleshoot hearing aid over the phone, if not able to get it to work he will keep appointment on 8/2    Patient call back [phone]

## 2023-08-01 ENCOUNTER — PROCEDURE VISIT (OUTPATIENT)
Dept: AUDIOLOGY | Age: 53
End: 2023-08-01

## 2023-08-01 DIAGNOSIS — H90.3 SENSORY HEARING LOSS, BILATERAL: Primary | ICD-10-CM

## 2023-08-01 PROCEDURE — NBSRV NON-BILLABLE SERVICE: Performed by: AUDIOLOGIST

## 2023-08-01 NOTE — PROGRESS NOTES
Nel Gamboa  [birth year]52 y.o. male   [id number]    HEARING AID CHECK    Nel Gamboa was seen today, 8/1/2023, for a hearing aid check appointment, accompanied by himself. RIGHT EAR: /MODEL: [employer] Ruby2 miniRITE-R  SN: [id number]  EARMOLD/TUBING/WIRE/DOME: 2(85) with 10mm double bass dome    LEFT EAR: /MODEL: O[employer]on Ruby2 miniRITE-R  SN: [id number]  EARMOLD/TUBING/WIRE/DOME: 2(85) with 10mm double bass dome    HAF: 12/21/2021  WARRANTY: 01/16/2025  30 DAY RIGHT-TO-RETURN: 01/21/2022     BUTTONS: volume control activated  PROGRAMS: All-Around  PHONE CONNECTIVITY: devices connected to patient's iPhone       PROCEDURES:     Otoscopy revealed: Clear ear canals bilaterally    Patient noted right hearing aid stopped working yesterday. He mentioned not having a tight seal with the domes and wanted to increase the size. Hearing aids were cleaned and checked. A listening check revealed non-working right hearing aid and good function of the left hearing aid. Troubleshooting revealed broken  wire and was replaced in the right hearing aid. Microphone and  ports were suctioned, domes and wax traps were changed, and devices completed a desiccant cycle through [employer]. Post-cleaning listening check revealed good function of the devices. Switched from 8mm double bass to 10mm double bass domes. PATIENT EDUCATION:     - Verbally and visually reviewed importance of consistent use and care and maintenance of devices. Information was verbally shared with patient during appointment. RECOMMENDATIONS:     Continue consistent hearing aid use  Return for hearing aid checks as needed  Retest hearing as medically indicated and/or sooner if a change in hearing is noted.   Contact audiologist with questions/concerns as needed    **No charge for today's appointment    Cristiano Ruffin  Audiologist

## 2023-08-01 NOTE — TELEPHONE ENCOUNTER
Spoke to patient. Left one is fine. Right aid is not working at all. Patient has tried changing filters, he's reset his phone, turned devices on and off. No warnings or messages in pasha. Normal lights when charging. Appointment on Wednesday. Anything else he can do in the mean time?

## 2023-08-21 ENCOUNTER — TELEPHONE (OUTPATIENT)
Dept: ENT CLINIC | Age: 53
End: 2023-08-21

## 2023-08-21 NOTE — TELEPHONE ENCOUNTER
Called patient to schedule for tinnitus program. Patient is not interested in the program at this time.

## 2024-03-06 ENCOUNTER — PROCEDURE VISIT (OUTPATIENT)
Dept: AUDIOLOGY | Age: 54
End: 2024-03-06

## 2024-03-06 DIAGNOSIS — H90.3 SENSORINEURAL HEARING LOSS, BILATERAL: Primary | ICD-10-CM

## 2024-03-06 DIAGNOSIS — H93.13 TINNITUS, BILATERAL: ICD-10-CM

## 2024-03-06 NOTE — PROGRESS NOTES
Emanuel Painting  1970.53 y.o. male   [id number]    HEARING AID CHECK    Emanuel Painting was seen today, 3/6/2024, for a hearing aid check appointment    PROCEDURES:     Otoscopy revealed: Clear ear canals bilaterally    Patient noted left device not holding charge.  Hearing aids were cleaned and checked.  A listening check revealed good function of the devices.  Microphone and  ports were suctioned, domes and wax traps were changed, and devices completed a desiccant cycle through [employer]. Post-cleaning listening check revealed good function of the devices.       Connected devices to fitting software. [employer] health low. Replaced with new left battery (SN: [id number]). Patient will return for new right battery.     PATIENT EDUCATION:     - Verbally and visually reviewed importance of consistent use and care and maintenance of devices.      Information was verbally shared with patient during appointment.        RECOMMENDATIONS:     Continue consistent hearing aid use  Return for hearing aid checks as needed  Retest hearing as medically indicated and/or sooner if a change in hearing is noted.  Contact audiologist with questions/concerns as needed    **No charge for today's appointment (no charge to change battery at next appointment)         Cristiano Dunn  Audiologist

## 2024-03-28 ENCOUNTER — PROCEDURE VISIT (OUTPATIENT)
Dept: AUDIOLOGY | Age: 54
End: 2024-03-28

## 2024-03-28 DIAGNOSIS — H90.3 SENSORINEURAL HEARING LOSS, BILATERAL: Primary | ICD-10-CM

## 2024-03-28 NOTE — PROGRESS NOTES
Emanuel Painting  1970.53 y.o. male   [id number]    HEARING AID CHECK    Emanuel Painting was seen today, 3/28/2024, for a hearing aid check appointment, accompanied by himself.      RIGHT EAR: /MODEL: Oticon Ruby2 miniRITE-R  SN: [id number]  EARMOLD/TUBING/WIRE/DOME: 2(85) with 10mm double bass dome    LEFT EAR: /MODEL: Oticon Ruby2 miniRITE-R  SN: [id number]  EARMOLD/TUBING/WIRE/DOME: 2(85) with 10mm double bass dome    HAF: 12/21/2021  WARRANTY: 01/16/2025  30 DAY RIGHT-TO-RETURN: 01/21/2022     BUTTONS: volume control activated  PROGRAMS: All-Around  PHONE CONNECTIVITY: devices connected to patient's iPhone    PROCEDURES:     Patient was seen today to change right rechargeable battery and  wire.  Replaced battery and  wire.       RECOMMENDATIONS:     Continue consistent hearing aid use  Return for hearing aid checks as needed  Retest hearing as medically indicated and/or sooner if a change in hearing is noted.  Contact audiologist with questions/concerns as needed    **No charge for today's appointment    Cristiano Watts  Audiologist

## 2024-09-05 ENCOUNTER — TELEPHONE (OUTPATIENT)
Dept: ENT CLINIC | Age: 54
End: 2024-09-05

## 2024-09-05 ENCOUNTER — TELEPHONE (OUTPATIENT)
Dept: AUDIOLOGY | Age: 54
End: 2024-09-05

## 2024-09-05 NOTE — TELEPHONE ENCOUNTER
Patient would like to know the exact date when his warranty expires. Patient said he can be reached by email or phone.   [email or web address]  [phone]

## 2024-12-11 ENCOUNTER — APPOINTMENT (OUTPATIENT)
Dept: GENERAL RADIOLOGY | Age: 54
End: 2024-12-11
Payer: COMMERCIAL

## 2024-12-11 ENCOUNTER — HOSPITAL ENCOUNTER (EMERGENCY)
Age: 54
Discharge: HOME | End: 2024-12-11
Attending: STUDENT IN AN ORGANIZED HEALTH CARE EDUCATION/TRAINING PROGRAM
Payer: COMMERCIAL

## 2024-12-11 VITALS
SYSTOLIC BLOOD PRESSURE: 132 MMHG | HEIGHT: 65 IN | HEART RATE: 93 BPM | BODY MASS INDEX: 27.32 KG/M2 | RESPIRATION RATE: 16 BRPM | DIASTOLIC BLOOD PRESSURE: 85 MMHG | WEIGHT: 164 LBS | OXYGEN SATURATION: 100 % | TEMPERATURE: 98.1 F

## 2024-12-11 DIAGNOSIS — R09.A2 GLOBUS SENSATION: Primary | ICD-10-CM

## 2024-12-11 PROCEDURE — 96372 THER/PROPH/DIAG INJ SC/IM: CPT

## 2024-12-11 PROCEDURE — 99284 EMERGENCY DEPT VISIT MOD MDM: CPT

## 2024-12-11 PROCEDURE — 70360 X-RAY EXAM OF NECK: CPT

## 2024-12-11 PROCEDURE — 6360000002 HC RX W HCPCS: Performed by: STUDENT IN AN ORGANIZED HEALTH CARE EDUCATION/TRAINING PROGRAM

## 2024-12-11 RX ORDER — PREDNISONE 10 MG/1
TABLET ORAL
Qty: 20 TABLET | Refills: 0 | Status: SHIPPED | OUTPATIENT
Start: 2024-12-11 | End: 2024-12-21

## 2024-12-11 RX ORDER — DEXAMETHASONE SODIUM PHOSPHATE 10 MG/ML
10 INJECTION INTRAMUSCULAR; INTRAVENOUS ONCE
Status: COMPLETED | OUTPATIENT
Start: 2024-12-11 | End: 2024-12-11

## 2024-12-11 RX ADMIN — DEXAMETHASONE SODIUM PHOSPHATE 10 MG: 10 INJECTION INTRAMUSCULAR; INTRAVENOUS at 08:11

## 2024-12-11 ASSESSMENT — PAIN SCALES - GENERAL
PAINLEVEL_OUTOF10: 0
PAINLEVEL_OUTOF10: 0

## 2024-12-11 ASSESSMENT — PAIN - FUNCTIONAL ASSESSMENT
PAIN_FUNCTIONAL_ASSESSMENT: 0-10
PAIN_FUNCTIONAL_ASSESSMENT: NONE - DENIES PAIN

## 2024-12-11 NOTE — ED PROVIDER NOTES
1.651 m (5' 5\"), weight 74.4 kg (164 lb), SpO2 100%.    Patient was given scripts for the following medications. I counseled patient how to take these medications.   New Prescriptions    PREDNISONE (DELTASONE) 10 MG TABLET    Take 4 tablets by mouth once daily for 5 days       Disposition referral (if applicable):  [facility]  [address] [place] [address] [id number] [phone]        Omar Maciel, APRN - CNP  [address] [facility] [id number] [phone]              Total critical care time is 0 minutes, which excludes separately billable procedures and updating family. Time spent is specifically for management of the presenting complaint and symptoms initially, direct bedside care, reevaluation, review of records, and consultation.  There was a high probability of clinically significant life-threatening deterioration in the patient's condition, which required my urgent intervention.     This chart was generated in part by using Dragon Dictation system and may contain errors related to that system including errors in grammar, punctuation, and spelling, as well as words and phrases that may be inappropriate. If there are any questions or concerns please feel free to contact the dictating provider for clarification.     Keily Rogers MD   Acute [facility]        Keily Rogers MD  12/11/24 [id number]

## 2024-12-11 NOTE — PROGRESS NOTES
0.5 MG tablet Take 1 tablet by mouth 2 times daily.   Yes Provider, MD Wendie   Ascorbic Acid (VITAMIN C) 250 MG tablet Take 1 tablet by mouth daily   Yes Wendie Green MD   busPIRone (BUSPAR) 10 MG tablet TAKE ONE TABLET BY MOUTH TWICE A DAY  Patient not taking: Reported on 12/12/2024 10/8/20   ProviderWendie MD     REVIEW OF SYSTEMS  The following systems were reviewed and revealed the following in addition to any already discussed in the HPI:  Review of Systems   Constitutional:  Negative for fatigue and fever.   HENT:  Positive for hearing loss, tinnitus and trouble swallowing. Negative for congestion, ear pain, postnasal drip, rhinorrhea and sneezing.    Eyes:  Negative for pain and visual disturbance.   Respiratory:  Negative for cough and shortness of breath.    Cardiovascular:  Negative for chest pain.   Gastrointestinal:  Negative for nausea and vomiting.   Endocrine: Negative.    Genitourinary: Negative.    Musculoskeletal:  Negative for neck pain and neck stiffness.   Skin:  Negative for rash.   Neurological:  Negative for dizziness and headaches.      PHYSICAL EXAM  /84   Pulse (!) 117   Ht 1.651 m (5' 5\")   Wt 72.6 kg (160 lb)   BMI 26.63 kg/m²   GENERAL: No Acute Distress, Alert and Oriented, no hoarseness  EYES: EOMI, Anti-icteric  NOSE: No epistaxis, nasal mucosa within normal limits, no purulent drainage  EARS: Normal external canal appearance, EAC patent bilaterally, TM's intact bilaterally with no evidence of effusions   FACE: 1/6 House-Brackmann Scale, symmetric, sensation equal bilaterally  ORAL CAVITY: No masses or lesions palpated, uvula is midline, moist mucous membranes,   NECK: Normal range of motion, no thyromegaly, trachea is midline, no lymphadenopathy, no neck masses, no crepitus  CHEST: Normal respiratory effort, no retractions, breathing comfortably  SKIN: No rashes, normal appearing skin, no evidence of skin lesions/tumors  RADIOLOGY  Summary of

## 2024-12-12 ENCOUNTER — OFFICE VISIT (OUTPATIENT)
Dept: ENT CLINIC | Age: 54
End: 2024-12-12
Payer: COMMERCIAL

## 2024-12-12 VITALS
WEIGHT: 160 LBS | DIASTOLIC BLOOD PRESSURE: 84 MMHG | HEIGHT: 65 IN | SYSTOLIC BLOOD PRESSURE: 139 MMHG | HEART RATE: 117 BPM | BODY MASS INDEX: 26.66 KG/M2

## 2024-12-12 DIAGNOSIS — H61.21 IMPACTED CERUMEN OF RIGHT EAR: ICD-10-CM

## 2024-12-12 DIAGNOSIS — H93.13 TINNITUS OF BOTH EARS: ICD-10-CM

## 2024-12-12 DIAGNOSIS — H81.02 MENIERE'S DISEASE OF LEFT EAR: ICD-10-CM

## 2024-12-12 DIAGNOSIS — R09.A2 GLOBUS SENSATION: Primary | ICD-10-CM

## 2024-12-12 PROCEDURE — 99213 OFFICE O/P EST LOW 20 MIN: CPT | Performed by: STUDENT IN AN ORGANIZED HEALTH CARE EDUCATION/TRAINING PROGRAM

## 2024-12-12 PROCEDURE — 31575 DIAGNOSTIC LARYNGOSCOPY: CPT | Performed by: STUDENT IN AN ORGANIZED HEALTH CARE EDUCATION/TRAINING PROGRAM

## 2024-12-12 PROCEDURE — 69210 REMOVE IMPACTED EAR WAX UNI: CPT | Performed by: STUDENT IN AN ORGANIZED HEALTH CARE EDUCATION/TRAINING PROGRAM

## 2024-12-12 RX ORDER — QUETIAPINE FUMARATE 200 MG/1
250 TABLET, FILM COATED ORAL
COMMUNITY

## 2024-12-12 RX ORDER — VENLAFAXINE 37.5 MG/1
150 TABLET ORAL DAILY
COMMUNITY

## 2024-12-12 ASSESSMENT — ENCOUNTER SYMPTOMS
RHINORRHEA: 0
VOMITING: 0
NAUSEA: 0
SHORTNESS OF BREATH: 0
EYE PAIN: 0
TROUBLE SWALLOWING: 1
COUGH: 0